# Patient Record
Sex: MALE | Race: WHITE | NOT HISPANIC OR LATINO | Employment: PART TIME | URBAN - METROPOLITAN AREA
[De-identification: names, ages, dates, MRNs, and addresses within clinical notes are randomized per-mention and may not be internally consistent; named-entity substitution may affect disease eponyms.]

---

## 2017-03-03 RX ORDER — AMOXICILLIN 500 MG/1
CAPSULE ORAL
Qty: 8 CAPSULE | Refills: 0 | Status: ON HOLD | OUTPATIENT
Start: 2017-03-03 | End: 2022-03-22 | Stop reason: HOSPADM

## 2022-03-17 ENCOUNTER — HOSPITAL ENCOUNTER (OUTPATIENT)
Dept: PREADMISSION TESTING | Facility: OTHER | Age: 72
Discharge: HOME | End: 2022-03-17
Attending: ORTHOPAEDIC SURGERY
Payer: MEDICARE

## 2022-03-17 ENCOUNTER — ANESTHESIA EVENT (OUTPATIENT)
Dept: SURGERY | Facility: OTHER | Age: 72
End: 2022-03-17
Payer: MEDICARE

## 2022-03-17 VITALS
HEART RATE: 68 BPM | WEIGHT: 230 LBS | SYSTOLIC BLOOD PRESSURE: 156 MMHG | OXYGEN SATURATION: 98 % | DIASTOLIC BLOOD PRESSURE: 84 MMHG | HEIGHT: 71 IN | BODY MASS INDEX: 32.2 KG/M2

## 2022-03-17 DIAGNOSIS — Z01.818 PREOP TESTING: Primary | ICD-10-CM

## 2022-03-17 LAB
ANION GAP SERPL CALC-SCNC: 9 MMOL/L (ref 8–16)
BASOPHILS # BLD AUTO: 0.04 K/UL (ref 0–0.2)
BASOPHILS NFR BLD: 0.7 % (ref 0–1.9)
BUN SERPL-MCNC: 21 MG/DL (ref 8–23)
CALCIUM SERPL-MCNC: 9.5 MG/DL (ref 8.7–10.5)
CHLORIDE SERPL-SCNC: 110 MMOL/L (ref 95–110)
CO2 SERPL-SCNC: 24 MMOL/L (ref 23–29)
CREAT SERPL-MCNC: 0.9 MG/DL (ref 0.5–1.4)
DIFFERENTIAL METHOD: ABNORMAL
EOSINOPHIL # BLD AUTO: 0.1 K/UL (ref 0–0.5)
EOSINOPHIL NFR BLD: 1.2 % (ref 0–8)
ERYTHROCYTE [DISTWIDTH] IN BLOOD BY AUTOMATED COUNT: 13.3 % (ref 11.5–14.5)
EST. GFR  (AFRICAN AMERICAN): >60 ML/MIN/1.73 M^2
EST. GFR  (NON AFRICAN AMERICAN): >60 ML/MIN/1.73 M^2
GLUCOSE SERPL-MCNC: 168 MG/DL (ref 70–110)
HCT VFR BLD AUTO: 47.3 % (ref 40–54)
HGB BLD-MCNC: 16 G/DL (ref 14–18)
IMM GRANULOCYTES # BLD AUTO: 0.04 K/UL (ref 0–0.04)
IMM GRANULOCYTES NFR BLD AUTO: 0.7 % (ref 0–0.5)
LYMPHOCYTES # BLD AUTO: 1.8 K/UL (ref 1–4.8)
LYMPHOCYTES NFR BLD: 31.2 % (ref 18–48)
MCH RBC QN AUTO: 34.3 PG (ref 27–31)
MCHC RBC AUTO-ENTMCNC: 33.8 G/DL (ref 32–36)
MCV RBC AUTO: 101 FL (ref 82–98)
MONOCYTES # BLD AUTO: 0.7 K/UL (ref 0.3–1)
MONOCYTES NFR BLD: 12.1 % (ref 4–15)
NEUTROPHILS # BLD AUTO: 3.2 K/UL (ref 1.8–7.7)
NEUTROPHILS NFR BLD: 54.1 % (ref 38–73)
NRBC BLD-RTO: 0 /100 WBC
PLATELET # BLD AUTO: 153 K/UL (ref 150–450)
PLATELET BLD QL SMEAR: ABNORMAL
PMV BLD AUTO: 10.4 FL (ref 9.2–12.9)
POLYCHROMASIA BLD QL SMEAR: ABNORMAL
POTASSIUM SERPL-SCNC: 4.8 MMOL/L (ref 3.5–5.1)
RBC # BLD AUTO: 4.67 M/UL (ref 4.6–6.2)
SODIUM SERPL-SCNC: 143 MMOL/L (ref 136–145)
WBC # BLD AUTO: 5.86 K/UL (ref 3.9–12.7)

## 2022-03-17 PROCEDURE — 85025 COMPLETE CBC W/AUTO DIFF WBC: CPT | Performed by: ANESTHESIOLOGY

## 2022-03-17 PROCEDURE — 80048 BASIC METABOLIC PNL TOTAL CA: CPT | Performed by: ANESTHESIOLOGY

## 2022-03-17 PROCEDURE — 36415 COLL VENOUS BLD VENIPUNCTURE: CPT | Performed by: ANESTHESIOLOGY

## 2022-03-17 RX ORDER — SOTALOL HYDROCHLORIDE 120 MG/1
TABLET ORAL 2 TIMES DAILY
COMMUNITY
Start: 2022-01-13

## 2022-03-17 RX ORDER — GLUCOSAMINE/CHONDRO SU A 500-400 MG
1 TABLET ORAL 3 TIMES DAILY
COMMUNITY
End: 2023-02-14 | Stop reason: CLARIF

## 2022-03-17 RX ORDER — ACETAMINOPHEN 500 MG
1000 TABLET ORAL
Status: CANCELLED | OUTPATIENT
Start: 2022-03-17 | End: 2022-03-17

## 2022-03-17 RX ORDER — MULTIVITAMIN
1 TABLET ORAL DAILY
COMMUNITY
End: 2023-02-14 | Stop reason: CLARIF

## 2022-03-17 RX ORDER — SODIUM CHLORIDE, SODIUM LACTATE, POTASSIUM CHLORIDE, CALCIUM CHLORIDE 600; 310; 30; 20 MG/100ML; MG/100ML; MG/100ML; MG/100ML
INJECTION, SOLUTION INTRAVENOUS CONTINUOUS
Status: CANCELLED | OUTPATIENT
Start: 2022-03-17

## 2022-03-17 RX ORDER — NAPROXEN SODIUM 220 MG
220 TABLET ORAL 2 TIMES DAILY WITH MEALS
COMMUNITY
End: 2023-02-14 | Stop reason: CLARIF

## 2022-03-17 RX ORDER — METFORMIN HYDROCHLORIDE 750 MG/1
TABLET, EXTENDED RELEASE ORAL
COMMUNITY
Start: 2022-02-04

## 2022-03-17 RX ORDER — LIDOCAINE HYDROCHLORIDE 10 MG/ML
0.5 INJECTION, SOLUTION EPIDURAL; INFILTRATION; INTRACAUDAL; PERINEURAL ONCE
Status: CANCELLED | OUTPATIENT
Start: 2022-03-17 | End: 2022-03-17

## 2022-03-17 RX ORDER — APIXABAN 5 MG/1
TABLET, FILM COATED ORAL
Status: ON HOLD | COMMUNITY
Start: 2022-01-19 | End: 2022-03-22 | Stop reason: HOSPADM

## 2022-03-17 RX ORDER — PEN NEEDLE, DIABETIC 31 GX5/16"
NEEDLE, DISPOSABLE MISCELLANEOUS
COMMUNITY
Start: 2022-01-13

## 2022-03-17 RX ORDER — INSULIN GLARGINE 100 [IU]/ML
INJECTION, SOLUTION SUBCUTANEOUS 2 TIMES DAILY
COMMUNITY
Start: 2022-01-26

## 2022-03-17 RX ORDER — FAMOTIDINE 20 MG/1
20 TABLET, FILM COATED ORAL
Status: CANCELLED | OUTPATIENT
Start: 2022-03-17 | End: 2022-03-17

## 2022-03-17 RX ORDER — LISINOPRIL 20 MG/1
TABLET ORAL DAILY
COMMUNITY
Start: 2022-03-12

## 2022-03-17 RX ORDER — EMPAGLIFLOZIN 25 MG/1
TABLET, FILM COATED ORAL DAILY
COMMUNITY
Start: 2022-01-26

## 2022-03-17 NOTE — DISCHARGE INSTRUCTIONS
Information to Prepare you for your Surgery    PRE-ADMIT TESTING -  [phone]    [address] [facility]          Your surgery has been scheduled at [facility]. We are pleased to have the opportunity to serve you. For Further Information please call [phone].    On the day of surgery please report to the Information Desk on the 1st floor.    CONTACT YOUR PHYSICIAN'S OFFICE THE DAY PRIOR TO YOUR SURGERY TO OBTAIN YOUR ARRIVAL TIME.     The evening before surgery do not eat anything after 9 p.m. ( this includes hard candy, chewing gum and mints).  You may only have GATORADE, POWERADE AND WATER  from 9 p.m. until you leave your home.   DO NOT DRINK ANY LIQUIDS ON THE WAY TO THE HOSPITAL.      Why does your anesthesiologist allow you to drink Gatorade/Powerade before surgery?  Gatorade/Powerade helps to increase your comfort before surgery and to decrease your nausea after surgery. The carbohydrates in Gatorade/Powerade help reduce your body's stress response to surgery.  If you are a diabetic-drink only water prior to surgery.      Current Visitor policy(12/27/2021) - Patients may have 2 visitors pre and post procedure. Only 2 visitors will be allowed in the Surgical building with the patient.     SPECIAL MEDICATION INSTRUCTIONS: TAKE medications checked off by the Anesthesiologist on your Medication List.    Angiogram Patients: Take medications as instructed by your physician, including aspirin.     Surgery Patients:    If you take ASPIRIN - Your PHYSICIAN/SURGEON will need to inform you IF/OR when you need to stop taking aspirin prior to your surgery.     Do Not take any medications containing IBUPROFEN.    Do Not Wear any make-up (especially eye make-up) to surgery. Please remove any false eyelashes or eyelash extensions. If you arrive the day of surgery with makeup/eyelashes on you will be required to remove prior to surgery. (There is a risk of corneal  abrasions if eye makeup/eyelash extensions are not removed)      Leave all valuables at home.   Do Not wear any jewelry or watches, including any metal in body piercings. Jewelry must be removed prior to coming to the hospital.  There is a possibility that rings that are unable to be removed may be cut off if they are on the surgical extremity.    Please remove all hair extensions, wigs, clips and any other metal accessories/ ornaments from your hair.  These items may pose a flammable/fire risk in Surgery and must be removed.    Do not shave your surgical area at least 5 days prior to your surgery. The surgical prep will be performed at the hospital according to Infection Control regulations.    Contact Lens must be removed before surgery. Either do not wear the contact lens or bring a case and solution for storage.  Please bring a container for eyeglasses or dentures as required.  Bring any paperwork your physician has provided, such as consent forms,  history and physicals, doctor's orders, etc.   Bring comfortable clothes that are loose fitting to wear upon discharge. Take into consideration the type of surgery being performed.  Maintain your diet as advised per your physician the day prior to surgery.      Adequate rest the night before surgery is advised.   Park in the Parking lot behind the hospital or in the [facility] across the street from the parking lot. Parking is complimentary.  If you will be discharged the same day as your procedure, please arrange for a responsible adult to drive you home or to accompany you if traveling by taxi.   YOU WILL NOT BE PERMITTED TO DRIVE OR TO LEAVE THE HOSPITAL ALONE AFTER SURGERY.   If you are being discharged the same day, it is strongly recommended that you arrange for someone to remain with you for the first 24 hrs following your surgery.    The Surgeon will speak to your family/visitor after your surgery regarding the outcome of your surgery and post op  care.  The Surgeon may speak to you after your surgery, but there is a possibility you may not remember the details.  Please check with your family members regarding the conversation with the Surgeon.    We strongly recommend whoever is bringing you home be present for discharge instructions.  This will ensure a thorough understanding for your post op home care.    ALL CHILDREN MUST ALWAYS BE ACCOMPANIED BY AN ADULT.    Visitors-Refer to current Visitor policy handouts.    Thank you for your cooperation.  The Staff of [facility].            Bathing Instructions with Hibiclens    Shower the evening before and morning of your procedure with Hibiclens:  Wash your face with water and your regular face wash/soap  Apply Hibiclens directly on your skin or on a wet washcloth and wash gently. When showering: Move away from the shower stream when applying Hibiclens to avoid rinsing off too soon.  Rinse thoroughly with warm water  Do not dilute Hibiclens        Dry off as usual, do not use any deodorant, powder, body lotions, perfume, after shave or cologne.

## 2022-03-17 NOTE — ANESTHESIA PREPROCEDURE EVALUATION
03/17/2022  Anastacio Michaels is a 71 y.o., male.      Pre-op Assessment    I have reviewed the Patient Summary Reports.     I have reviewed the Nursing Notes. I have reviewed the NPO Status.   I have reviewed the Medications.     Review of Systems  Anesthesia Hx:  Hx of Anesthetic complications (post op nausea)    Social:  Smoker    Cardiovascular:   Hypertension, well controlled CAD  Dysrhythmias atrial fibrillation    Pulmonary:  Pulmonary Normal    Hepatic/GI:  Hepatic/GI Normal    Endocrine:   Diabetes, well controlled        Physical Exam  General: Well nourished    Airway:  Mallampati: II   Mouth Opening: Normal  TM Distance: Normal  Tongue: Normal  Neck ROM: Normal ROM    Dental:  Intact        Anesthesia Plan  Type of Anesthesia, risks & benefits discussed:    Anesthesia Type: Gen ETT  Intra-op Monitoring Plan: Standard ASA Monitors  Post Op Pain Control Plan: multimodal analgesia and peripheral nerve block  Induction:  IV  Airway Plan: Video  Informed Consent: Informed consent signed with the Patient and all parties understand the risks and agree with anesthesia plan.  All questions answered.   ASA Score: 3  Anesthesia Plan Notes: Pt has cardiac clearance in care everywhere 2/21/22      Discussed nerve block       Bmp/cbc done today    H&H/electrolytes normal    Ready For Surgery From Anesthesia Perspective.     .

## 2022-03-22 ENCOUNTER — ANESTHESIA (OUTPATIENT)
Dept: SURGERY | Facility: OTHER | Age: 72
End: 2022-03-22
Payer: MEDICARE

## 2022-03-22 ENCOUNTER — HOSPITAL ENCOUNTER (OUTPATIENT)
Facility: OTHER | Age: 72
Discharge: HOME | End: 2022-03-23
Attending: ORTHOPAEDIC SURGERY | Admitting: ORTHOPAEDIC SURGERY
Payer: MEDICARE

## 2022-03-22 DIAGNOSIS — M19.011 PRIMARY OSTEOARTHRITIS OF RIGHT SHOULDER: Primary | ICD-10-CM

## 2022-03-22 DIAGNOSIS — M19.011 OSTEOARTHRITIS OF RIGHT SHOULDER REGION: ICD-10-CM

## 2022-03-22 LAB
ESTIMATED AVG GLUCOSE: 146 MG/DL (ref 68–131)
HBA1C MFR BLD: 6.7 % (ref 4–5.6)
POCT GLUCOSE: 249 MG/DL (ref 70–110)
POCT GLUCOSE: 264 MG/DL (ref 70–110)

## 2022-03-22 PROCEDURE — P9045 ALBUMIN (HUMAN), 5%, 250 ML: HCPCS | Mod: JG | Performed by: NURSE ANESTHETIST, CERTIFIED REGISTERED

## 2022-03-22 PROCEDURE — 82962 GLUCOSE BLOOD TEST: CPT | Performed by: ORTHOPAEDIC SURGERY

## 2022-03-22 PROCEDURE — 36415 COLL VENOUS BLD VENIPUNCTURE: CPT | Performed by: INTERNAL MEDICINE

## 2022-03-22 PROCEDURE — 25000003 PHARM REV CODE 250: Performed by: ORTHOPAEDIC SURGERY

## 2022-03-22 PROCEDURE — 76942 ECHO GUIDE FOR BIOPSY: CPT | Performed by: ANESTHESIOLOGY

## 2022-03-22 PROCEDURE — 27201423 OPTIME MED/SURG SUP & DEVICES STERILE SUPPLY: Performed by: ORTHOPAEDIC SURGERY

## 2022-03-22 PROCEDURE — 25000003 PHARM REV CODE 250: Performed by: NURSE ANESTHETIST, CERTIFIED REGISTERED

## 2022-03-22 PROCEDURE — 83036 HEMOGLOBIN GLYCOSYLATED A1C: CPT | Performed by: INTERNAL MEDICINE

## 2022-03-22 PROCEDURE — 63600175 PHARM REV CODE 636 W HCPCS: Performed by: ORTHOPAEDIC SURGERY

## 2022-03-22 PROCEDURE — 63600175 PHARM REV CODE 636 W HCPCS: Performed by: NURSE ANESTHETIST, CERTIFIED REGISTERED

## 2022-03-22 PROCEDURE — 63600175 PHARM REV CODE 636 W HCPCS: Mod: JG | Performed by: NURSE ANESTHETIST, CERTIFIED REGISTERED

## 2022-03-22 PROCEDURE — 71000033 HC RECOVERY, INTIAL HOUR: Performed by: ORTHOPAEDIC SURGERY

## 2022-03-22 PROCEDURE — 94761 N-INVAS EAR/PLS OXIMETRY MLT: CPT

## 2022-03-22 PROCEDURE — 25000003 PHARM REV CODE 250: Performed by: INTERNAL MEDICINE

## 2022-03-22 PROCEDURE — 37000008 HC ANESTHESIA 1ST 15 MINUTES: Performed by: ORTHOPAEDIC SURGERY

## 2022-03-22 PROCEDURE — 37000009 HC ANESTHESIA EA ADD 15 MINS: Performed by: ORTHOPAEDIC SURGERY

## 2022-03-22 PROCEDURE — 63600175 PHARM REV CODE 636 W HCPCS: Performed by: ANESTHESIOLOGY

## 2022-03-22 PROCEDURE — 63600175 PHARM REV CODE 636 W HCPCS: Performed by: INTERNAL MEDICINE

## 2022-03-22 PROCEDURE — 25000003 PHARM REV CODE 250: Performed by: ANESTHESIOLOGY

## 2022-03-22 PROCEDURE — 97165 OT EVAL LOW COMPLEX 30 MIN: CPT

## 2022-03-22 PROCEDURE — 36000710: Performed by: ORTHOPAEDIC SURGERY

## 2022-03-22 PROCEDURE — 71000039 HC RECOVERY, EACH ADD'L HOUR: Performed by: ORTHOPAEDIC SURGERY

## 2022-03-22 PROCEDURE — 63600175 PHARM REV CODE 636 W HCPCS: Mod: JG | Performed by: ANESTHESIOLOGY

## 2022-03-22 PROCEDURE — C1713 ANCHOR/SCREW BN/BN,TIS/BN: HCPCS | Performed by: ORTHOPAEDIC SURGERY

## 2022-03-22 PROCEDURE — 27100025 HC TUBING, SET FLUID WARMER: Performed by: ANESTHESIOLOGY

## 2022-03-22 PROCEDURE — 94799 UNLISTED PULMONARY SVC/PX: CPT

## 2022-03-22 PROCEDURE — 97535 SELF CARE MNGMENT TRAINING: CPT

## 2022-03-22 PROCEDURE — 27000221 HC OXYGEN, UP TO 24 HOURS

## 2022-03-22 PROCEDURE — C1776 JOINT DEVICE (IMPLANTABLE): HCPCS | Performed by: ORTHOPAEDIC SURGERY

## 2022-03-22 PROCEDURE — P9045 ALBUMIN (HUMAN), 5%, 250 ML: HCPCS | Mod: JG | Performed by: ANESTHESIOLOGY

## 2022-03-22 PROCEDURE — 97110 THERAPEUTIC EXERCISES: CPT

## 2022-03-22 PROCEDURE — 36000711: Performed by: ORTHOPAEDIC SURGERY

## 2022-03-22 DEVICE — INSERT SOCKET E+ SM STD SZ32: Type: IMPLANTABLE DEVICE | Site: SHOULDER | Status: FUNCTIONAL

## 2022-03-22 DEVICE — HEAD GLENOID RSP 32MM REVERSE: Type: IMPLANTABLE DEVICE | Site: SHOULDER | Status: FUNCTIONAL

## 2022-03-22 DEVICE — SCREW GLENOID BASEPLT 5X34MM: Type: IMPLANTABLE DEVICE | Site: SHOULDER | Status: FUNCTIONAL

## 2022-03-22 DEVICE — BASEPLATE GLENOID REV RSP P2: Type: IMPLANTABLE DEVICE | Site: SHOULDER | Status: FUNCTIONAL

## 2022-03-22 DEVICE — SCREW BONE RSP 6.5X18 GLENOID: Type: IMPLANTABLE DEVICE | Site: SHOULDER | Status: FUNCTIONAL

## 2022-03-22 DEVICE — SCREW BONE LOCK 5X30MM TI: Type: IMPLANTABLE DEVICE | Site: SHOULDER | Status: FUNCTIONAL

## 2022-03-22 DEVICE — STEM ALTIVATE REV SZ10 108MM: Type: IMPLANTABLE DEVICE | Site: SHOULDER | Status: FUNCTIONAL

## 2022-03-22 RX ORDER — MORPHINE SULFATE 4 MG/ML
3 INJECTION, SOLUTION INTRAMUSCULAR; INTRAVENOUS
Status: DISCONTINUED | OUTPATIENT
Start: 2022-03-22 | End: 2022-03-23 | Stop reason: HOSPADM

## 2022-03-22 RX ORDER — CEFAZOLIN SODIUM 1 G/3ML
2 INJECTION, POWDER, FOR SOLUTION INTRAMUSCULAR; INTRAVENOUS
Status: DISCONTINUED | OUTPATIENT
Start: 2022-03-22 | End: 2022-03-22

## 2022-03-22 RX ORDER — FAMOTIDINE 20 MG/1
20 TABLET, FILM COATED ORAL
Status: COMPLETED | OUTPATIENT
Start: 2022-03-22 | End: 2022-03-22

## 2022-03-22 RX ORDER — DEXTROSE MONOHYDRATE AND SODIUM CHLORIDE 5; .9 G/100ML; G/100ML
INJECTION, SOLUTION INTRAVENOUS CONTINUOUS
Status: DISCONTINUED | OUTPATIENT
Start: 2022-03-22 | End: 2022-03-23 | Stop reason: HOSPADM

## 2022-03-22 RX ORDER — ONDANSETRON 2 MG/ML
INJECTION INTRAMUSCULAR; INTRAVENOUS
Status: DISCONTINUED | OUTPATIENT
Start: 2022-03-22 | End: 2022-03-22

## 2022-03-22 RX ORDER — GLUCAGON 1 MG
1 KIT INJECTION
Status: DISCONTINUED | OUTPATIENT
Start: 2022-03-22 | End: 2022-03-23 | Stop reason: HOSPADM

## 2022-03-22 RX ORDER — HYDROMORPHONE HYDROCHLORIDE 2 MG/ML
0.4 INJECTION, SOLUTION INTRAMUSCULAR; INTRAVENOUS; SUBCUTANEOUS EVERY 5 MIN PRN
Status: CANCELLED | OUTPATIENT
Start: 2022-03-22

## 2022-03-22 RX ORDER — CEFAZOLIN SODIUM 1 G/3ML
2 INJECTION, POWDER, FOR SOLUTION INTRAMUSCULAR; INTRAVENOUS
Status: COMPLETED | OUTPATIENT
Start: 2022-03-22 | End: 2022-03-22

## 2022-03-22 RX ORDER — IBUPROFEN 200 MG
16 TABLET ORAL
Status: DISCONTINUED | OUTPATIENT
Start: 2022-03-22 | End: 2022-03-23 | Stop reason: HOSPADM

## 2022-03-22 RX ORDER — EPHEDRINE SULFATE 50 MG/ML
INJECTION, SOLUTION INTRAVENOUS
Status: DISCONTINUED | OUTPATIENT
Start: 2022-03-22 | End: 2022-03-22

## 2022-03-22 RX ORDER — FAMOTIDINE 20 MG/1
20 TABLET, FILM COATED ORAL 2 TIMES DAILY
Status: DISCONTINUED | OUTPATIENT
Start: 2022-03-22 | End: 2022-03-23 | Stop reason: HOSPADM

## 2022-03-22 RX ORDER — ROPIVACAINE HYDROCHLORIDE 5 MG/ML
INJECTION, SOLUTION EPIDURAL; INFILTRATION; PERINEURAL
Status: COMPLETED | OUTPATIENT
Start: 2022-03-22 | End: 2022-03-22

## 2022-03-22 RX ORDER — IBUPROFEN 200 MG
24 TABLET ORAL
Status: DISCONTINUED | OUTPATIENT
Start: 2022-03-22 | End: 2022-03-23 | Stop reason: HOSPADM

## 2022-03-22 RX ORDER — SOTALOL HYDROCHLORIDE 80 MG/1
120 TABLET ORAL 2 TIMES DAILY
Status: DISCONTINUED | OUTPATIENT
Start: 2022-03-22 | End: 2022-03-23 | Stop reason: HOSPADM

## 2022-03-22 RX ORDER — ATORVASTATIN CALCIUM 20 MG/1
80 TABLET, FILM COATED ORAL NIGHTLY
Status: DISCONTINUED | OUTPATIENT
Start: 2022-03-22 | End: 2022-03-23 | Stop reason: HOSPADM

## 2022-03-22 RX ORDER — MUPIROCIN 20 MG/G
1 OINTMENT TOPICAL 2 TIMES DAILY
Status: DISCONTINUED | OUTPATIENT
Start: 2022-03-22 | End: 2022-03-23 | Stop reason: HOSPADM

## 2022-03-22 RX ORDER — PREGABALIN 75 MG/1
75 CAPSULE ORAL NIGHTLY
Status: DISCONTINUED | OUTPATIENT
Start: 2022-03-22 | End: 2022-03-23 | Stop reason: HOSPADM

## 2022-03-22 RX ORDER — ACETAMINOPHEN 500 MG
1000 TABLET ORAL EVERY 6 HOURS
Status: DISCONTINUED | OUTPATIENT
Start: 2022-03-22 | End: 2022-03-23 | Stop reason: HOSPADM

## 2022-03-22 RX ORDER — ONDANSETRON 8 MG/1
8 TABLET, ORALLY DISINTEGRATING ORAL EVERY 8 HOURS PRN
Status: DISCONTINUED | OUTPATIENT
Start: 2022-03-22 | End: 2022-03-23 | Stop reason: HOSPADM

## 2022-03-22 RX ORDER — LIDOCAINE HYDROCHLORIDE 10 MG/ML
0.5 INJECTION, SOLUTION EPIDURAL; INFILTRATION; INTRACAUDAL; PERINEURAL ONCE
Status: DISCONTINUED | OUTPATIENT
Start: 2022-03-22 | End: 2022-03-22 | Stop reason: HOSPADM

## 2022-03-22 RX ORDER — ACETAMINOPHEN 500 MG
1000 TABLET ORAL
Status: DISCONTINUED | OUTPATIENT
Start: 2022-03-22 | End: 2022-03-22

## 2022-03-22 RX ORDER — INSULIN ASPART 100 [IU]/ML
0-5 INJECTION, SOLUTION INTRAVENOUS; SUBCUTANEOUS
Status: DISCONTINUED | OUTPATIENT
Start: 2022-03-22 | End: 2022-03-23 | Stop reason: HOSPADM

## 2022-03-22 RX ORDER — METFORMIN HYDROCHLORIDE 750 MG/1
750 TABLET, EXTENDED RELEASE ORAL DAILY
Status: DISCONTINUED | OUTPATIENT
Start: 2022-03-23 | End: 2022-03-23 | Stop reason: HOSPADM

## 2022-03-22 RX ORDER — PROCHLORPERAZINE EDISYLATE 5 MG/ML
5 INJECTION INTRAMUSCULAR; INTRAVENOUS EVERY 6 HOURS PRN
Status: DISCONTINUED | OUTPATIENT
Start: 2022-03-22 | End: 2022-03-23 | Stop reason: HOSPADM

## 2022-03-22 RX ORDER — PHENYLEPHRINE HYDROCHLORIDE 10 MG/ML
INJECTION INTRAVENOUS
Status: DISCONTINUED | OUTPATIENT
Start: 2022-03-22 | End: 2022-03-22

## 2022-03-22 RX ORDER — CELECOXIB 200 MG/1
200 CAPSULE ORAL DAILY
Status: DISCONTINUED | OUTPATIENT
Start: 2022-03-22 | End: 2022-03-23 | Stop reason: HOSPADM

## 2022-03-22 RX ORDER — SODIUM CHLORIDE, SODIUM LACTATE, POTASSIUM CHLORIDE, CALCIUM CHLORIDE 600; 310; 30; 20 MG/100ML; MG/100ML; MG/100ML; MG/100ML
INJECTION, SOLUTION INTRAVENOUS CONTINUOUS
Status: DISCONTINUED | OUTPATIENT
Start: 2022-03-22 | End: 2022-03-23 | Stop reason: HOSPADM

## 2022-03-22 RX ORDER — ROCURONIUM BROMIDE 10 MG/ML
INJECTION, SOLUTION INTRAVENOUS
Status: DISCONTINUED | OUTPATIENT
Start: 2022-03-22 | End: 2022-03-22

## 2022-03-22 RX ORDER — CELECOXIB 200 MG/1
400 CAPSULE ORAL
Status: COMPLETED | OUTPATIENT
Start: 2022-03-22 | End: 2022-03-22

## 2022-03-22 RX ORDER — SODIUM CHLORIDE 0.9 % (FLUSH) 0.9 %
10 SYRINGE (ML) INJECTION
Status: DISCONTINUED | OUTPATIENT
Start: 2022-03-22 | End: 2022-03-23 | Stop reason: HOSPADM

## 2022-03-22 RX ORDER — MEPERIDINE HYDROCHLORIDE 25 MG/ML
12.5 INJECTION INTRAMUSCULAR; INTRAVENOUS; SUBCUTANEOUS ONCE AS NEEDED
Status: CANCELLED | OUTPATIENT
Start: 2022-03-22 | End: 2022-03-23

## 2022-03-22 RX ORDER — HYDROCODONE BITARTRATE AND ACETAMINOPHEN 10; 325 MG/1; MG/1
1 TABLET ORAL EVERY 4 HOURS PRN
Status: DISCONTINUED | OUTPATIENT
Start: 2022-03-22 | End: 2022-03-23 | Stop reason: HOSPADM

## 2022-03-22 RX ORDER — MUPIROCIN 20 MG/G
OINTMENT TOPICAL
Status: DISCONTINUED | OUTPATIENT
Start: 2022-03-22 | End: 2022-03-22 | Stop reason: HOSPADM

## 2022-03-22 RX ORDER — LIDOCAINE HCL/PF 100 MG/5ML
SYRINGE (ML) INTRAVENOUS
Status: DISCONTINUED | OUTPATIENT
Start: 2022-03-22 | End: 2022-03-22

## 2022-03-22 RX ORDER — OXYCODONE HYDROCHLORIDE 5 MG/1
5 TABLET ORAL
Status: CANCELLED | OUTPATIENT
Start: 2022-03-22

## 2022-03-22 RX ORDER — ONDANSETRON 2 MG/ML
4 INJECTION INTRAMUSCULAR; INTRAVENOUS DAILY PRN
Status: CANCELLED | OUTPATIENT
Start: 2022-03-22

## 2022-03-22 RX ORDER — POLYETHYLENE GLYCOL 3350 17 G/17G
17 POWDER, FOR SOLUTION ORAL DAILY
Status: DISCONTINUED | OUTPATIENT
Start: 2022-03-22 | End: 2022-03-23 | Stop reason: HOSPADM

## 2022-03-22 RX ORDER — FENTANYL CITRATE 50 UG/ML
INJECTION, SOLUTION INTRAMUSCULAR; INTRAVENOUS
Status: DISCONTINUED | OUTPATIENT
Start: 2022-03-22 | End: 2022-03-22

## 2022-03-22 RX ORDER — HYDROCODONE BITARTRATE AND ACETAMINOPHEN 5; 325 MG/1; MG/1
1 TABLET ORAL EVERY 4 HOURS PRN
Status: DISCONTINUED | OUTPATIENT
Start: 2022-03-22 | End: 2022-03-23 | Stop reason: HOSPADM

## 2022-03-22 RX ORDER — PROPOFOL 10 MG/ML
VIAL (ML) INTRAVENOUS
Status: DISCONTINUED | OUTPATIENT
Start: 2022-03-22 | End: 2022-03-22

## 2022-03-22 RX ORDER — LISINOPRIL 20 MG/1
20 TABLET ORAL DAILY
Status: DISCONTINUED | OUTPATIENT
Start: 2022-03-23 | End: 2022-03-23 | Stop reason: HOSPADM

## 2022-03-22 RX ORDER — ACETAMINOPHEN 500 MG
1000 TABLET ORAL
Status: COMPLETED | OUTPATIENT
Start: 2022-03-22 | End: 2022-03-22

## 2022-03-22 RX ORDER — CEFAZOLIN SODIUM 2 G/50ML
2 SOLUTION INTRAVENOUS
Status: COMPLETED | OUTPATIENT
Start: 2022-03-22 | End: 2022-03-23

## 2022-03-22 RX ORDER — AMOXICILLIN 250 MG
1 CAPSULE ORAL 2 TIMES DAILY
Status: DISCONTINUED | OUTPATIENT
Start: 2022-03-22 | End: 2022-03-23 | Stop reason: HOSPADM

## 2022-03-22 RX ORDER — ALBUMIN HUMAN 50 G/1000ML
25 SOLUTION INTRAVENOUS ONCE
Status: COMPLETED | OUTPATIENT
Start: 2022-03-22 | End: 2022-03-22

## 2022-03-22 RX ORDER — TRANEXAMIC ACID 100 MG/ML
1000 INJECTION, SOLUTION INTRAVENOUS
Status: DISCONTINUED | OUTPATIENT
Start: 2022-03-22 | End: 2022-03-22 | Stop reason: HOSPADM

## 2022-03-22 RX ORDER — ALBUMIN HUMAN 50 G/1000ML
SOLUTION INTRAVENOUS CONTINUOUS PRN
Status: DISCONTINUED | OUTPATIENT
Start: 2022-03-22 | End: 2022-03-22

## 2022-03-22 RX ORDER — MIDAZOLAM HYDROCHLORIDE 1 MG/ML
INJECTION INTRAMUSCULAR; INTRAVENOUS
Status: DISCONTINUED | OUTPATIENT
Start: 2022-03-22 | End: 2022-03-22

## 2022-03-22 RX ORDER — SODIUM CHLORIDE 0.9 % (FLUSH) 0.9 %
3 SYRINGE (ML) INJECTION
Status: CANCELLED | OUTPATIENT
Start: 2022-03-22

## 2022-03-22 RX ADMIN — ACETAMINOPHEN 1000 MG: 500 TABLET, FILM COATED ORAL at 11:03

## 2022-03-22 RX ADMIN — MUPIROCIN: 20 OINTMENT TOPICAL at 09:03

## 2022-03-22 RX ADMIN — CELECOXIB 400 MG: 200 CAPSULE ORAL at 09:03

## 2022-03-22 RX ADMIN — ALBUMIN (HUMAN) 25 G: 12.5 SOLUTION INTRAVENOUS at 02:03

## 2022-03-22 RX ADMIN — ACETAMINOPHEN 1000 MG: 500 TABLET, FILM COATED ORAL at 09:03

## 2022-03-22 RX ADMIN — ONDANSETRON HYDROCHLORIDE 4 MG: 2 INJECTION INTRAMUSCULAR; INTRAVENOUS at 01:03

## 2022-03-22 RX ADMIN — CEFAZOLIN SODIUM 2 G: 2 SOLUTION INTRAVENOUS at 06:03

## 2022-03-22 RX ADMIN — SOTALOL HYDROCHLORIDE 120 MG: 80 TABLET ORAL at 08:03

## 2022-03-22 RX ADMIN — ALBUMIN (HUMAN): 12.5 SOLUTION INTRAVENOUS at 11:03

## 2022-03-22 RX ADMIN — DOCUSATE SODIUM - SENNOSIDES 1 TABLET: 50; 8.6 TABLET, FILM COATED ORAL at 08:03

## 2022-03-22 RX ADMIN — FAMOTIDINE 20 MG: 20 TABLET, FILM COATED ORAL at 09:03

## 2022-03-22 RX ADMIN — ROPIVACAINE HYDROCHLORIDE 20 ML: 5 INJECTION, SOLUTION EPIDURAL; INFILTRATION; PERINEURAL at 10:03

## 2022-03-22 RX ADMIN — ATORVASTATIN CALCIUM 80 MG: 20 TABLET, FILM COATED ORAL at 08:03

## 2022-03-22 RX ADMIN — ROCURONIUM BROMIDE 50 MG: 10 SOLUTION INTRAVENOUS at 11:03

## 2022-03-22 RX ADMIN — LIDOCAINE HYDROCHLORIDE 100 MG: 20 INJECTION, SOLUTION INTRAVENOUS at 11:03

## 2022-03-22 RX ADMIN — PHENYLEPHRINE HYDROCHLORIDE 0.5 MCG/KG/MIN: 10 INJECTION INTRAVENOUS at 11:03

## 2022-03-22 RX ADMIN — CEFAZOLIN 2 G: 330 INJECTION, POWDER, FOR SOLUTION INTRAMUSCULAR; INTRAVENOUS at 11:03

## 2022-03-22 RX ADMIN — EPHEDRINE SULFATE 10 MG: 50 INJECTION INTRAVENOUS at 12:03

## 2022-03-22 RX ADMIN — ACETAMINOPHEN 1000 MG: 500 TABLET, FILM COATED ORAL at 06:03

## 2022-03-22 RX ADMIN — CARBOXYMETHYLCELLULOSE SODIUM 2 DROP: 2.5 SOLUTION/ DROPS OPHTHALMIC at 11:03

## 2022-03-22 RX ADMIN — PROPOFOL 50 MG: 10 INJECTION, EMULSION INTRAVENOUS at 01:03

## 2022-03-22 RX ADMIN — ROCURONIUM BROMIDE 20 MG: 10 SOLUTION INTRAVENOUS at 12:03

## 2022-03-22 RX ADMIN — SODIUM CHLORIDE, SODIUM LACTATE, POTASSIUM CHLORIDE, AND CALCIUM CHLORIDE: .6; .31; .03; .02 INJECTION, SOLUTION INTRAVENOUS at 01:03

## 2022-03-22 RX ADMIN — FENTANYL CITRATE 100 MCG: 50 INJECTION, SOLUTION INTRAMUSCULAR; INTRAVENOUS at 10:03

## 2022-03-22 RX ADMIN — SODIUM CHLORIDE, SODIUM LACTATE, POTASSIUM CHLORIDE, AND CALCIUM CHLORIDE: .6; .31; .03; .02 INJECTION, SOLUTION INTRAVENOUS at 10:03

## 2022-03-22 RX ADMIN — MIDAZOLAM HYDROCHLORIDE 2 MG: 1 INJECTION, SOLUTION INTRAMUSCULAR; INTRAVENOUS at 10:03

## 2022-03-22 RX ADMIN — PHENYLEPHRINE HYDROCHLORIDE 100 MCG: 10 INJECTION INTRAVENOUS at 11:03

## 2022-03-22 RX ADMIN — GLYCOPYRROLATE 0.2 MG: 0.2 INJECTION, SOLUTION INTRAMUSCULAR; INTRAVITREAL at 11:03

## 2022-03-22 RX ADMIN — CELECOXIB 200 MG: 200 CAPSULE ORAL at 03:03

## 2022-03-22 RX ADMIN — MUPIROCIN 1 G: 20 OINTMENT TOPICAL at 08:03

## 2022-03-22 RX ADMIN — PROPOFOL 180 MG: 10 INJECTION, EMULSION INTRAVENOUS at 11:03

## 2022-03-22 RX ADMIN — FAMOTIDINE 20 MG: 20 TABLET ORAL at 08:03

## 2022-03-22 RX ADMIN — ROCURONIUM BROMIDE 20 MG: 10 SOLUTION INTRAVENOUS at 11:03

## 2022-03-22 RX ADMIN — DEXTROSE AND SODIUM CHLORIDE: 5; .9 INJECTION, SOLUTION INTRAVENOUS at 03:03

## 2022-03-22 RX ADMIN — INSULIN ASPART 1 UNITS: 100 INJECTION, SOLUTION INTRAVENOUS; SUBCUTANEOUS at 11:03

## 2022-03-22 RX ADMIN — SUGAMMADEX 200 MG: 100 INJECTION, SOLUTION INTRAVENOUS at 01:03

## 2022-03-22 RX ADMIN — HYDROCODONE BITARTRATE AND ACETAMINOPHEN 1 TABLET: 10; 325 TABLET ORAL at 02:03

## 2022-03-22 RX ADMIN — PREGABALIN 75 MG: 75 CAPSULE ORAL at 08:03

## 2022-03-22 NOTE — BRIEF OP NOTE
[facility] ([facility]   Operative Note     SUMMARY     Surgery Date: 3/22/2022     Surgeon(s) and Role:     * Claude S. Williams IV, MD - Primary    Assisting Surgeon: None    Pre-op Diagnosis:  Primary osteoarthritis of right shoulder [M19.011]  Rotator cuff tear arthropathy    Post-op Diagnosis:  Post-Op Diagnosis Codes:     * Primary osteoarthritis of right shoulder [M19.011]  Rotator cuff tear arthropathy    Procedure(s) (LRB):  ARTHROPLASTY, SHOULDER, TOTAL, REVERSE - RIGHT (Right)   DJO  surgical Press-Fit humeral stem small shell 10 x 108 mm  30 mm RSP glenoid base plate, 32 neutral RSP glenoid head with retaining screw, small socket insert 32 mm neutral    Anesthesia: General    Description of the findings of the procedure:  Left shoulder end-stage degenerative osteoarthritis, significant synovitis, chronic appearing partial biceps tendon tear, partial rotator cuff tear.     Findings/Key Components:  As above      Procedure in detail:  After proper informed consent was obtained the patient was transported the operative suite placed supine on operative table.  Preoperative interscalene block   Was administered per the anesthesiologist without difficulty.  General endotracheal anesthesia. The patient was placed into a modified beach chair position and all bony prominences were well padded.  The right shoulder was sterilely prepped with alcohol ChloraPrep and draped in usual sterile fashion.  Preoperative antibiotics were administered.  Routine preoperative time-out was taken and the operative site was positively identified by the operative team.  A standard anterior incision was made and careful dissection was carried down through the subdermal tissue using Bovie cautery for hemostasis.  The deltopectoral interval was retracted  And the conjoined tendon was retracted medially.  There was a patulous capsule with significant synovial fluid and degenerative tearing of the rotator cuff supraspinatus and  subscapularis as well as biceps tendon which was excised.  The shoulder was dislocated.  The humeral head cut was made according to the preoperative surgical plan.  Attention was turned to the glenoid where releases were performed and a guide pin was placed using the guide and this was measured 30 mm.  The tap was placed and reaming was carried out..  There was significant glenoid retroversion noted.  This was corrected with reaming slightly offset reaming more of the inferior and anterior glenoid as according to the preoperative plan.  The base plate was then placed and had excellent purchase then 4 peripheral locking screws were each drilled measured and appropriate length locking screws were placed and an excellent secure fixation in the base plate.  The RSP glenoid head was then impacted on the Wolfe taper and the center screw was secured.  Attention was then turned to the humerus where reaming of the cup was carried out followed by hand reaming and broaching to size 10 humeral stem which was impacted in place.  The shoulder was then taken through trials and after trialing various insert for stability.   final implants were inserted.  The shoulder was stable through range of motion with full elevation, 50° external rotation and internal rotation without impingement.  The wound was irrigated and hemostasis was again confirmed.  The wound was then closed over a medium Hemovac drain using  2. Ethibond to close the subscapularis and rotator cuff interval remaining. .  The skin was approximated with 2-0 Vicryl and sterile skin staples.  Sterile soft dressing was then applied.  An abduction pillow was placed.  The patient was transported to the operative suite and taken to recovery in stable condition he tolerated the procedure very well.  Lap instrument and needle counts were correct.  Blood loss minimal.  Complications none    Findings/Key Components:  End-stage degenerative cartilage wear with eburnated humeral  "head and glenoid and tear of the supraspinatus and partial infraspinatus.    Estimated Blood Loss: 250 mL         Specimens:   Specimen (24h ago, onward)            None          Discharge Note    SUMMARY     Admit Date: 3/22/2022    Discharge Date and Time:  03/23/2022 7:57 AM    Hospital Course (synopsis of major diagnoses, care, treatment, and services provided during the course of the hospital stay): Tolerated the procedure well. POD #1 comfortable, tolerating po, voiding. Neuro, vasc intact     Final Diagnosis: Post-Op Diagnosis Codes:     * Primary osteoarthritis of right shoulder [M19.011]    Disposition: Home or Self Care    Follow Up/Patient Instructions:     Medications:  Reconciled Home Medications:      Medication List      CONTINUE taking these medications    atorvastatin 80 MG tablet  Commonly known as: LIPITOR     BD ULTRA-FINE SHORT PEN NEEDLE 31 gauge x 5/16" Ndle  Generic drug: pen needle, diabetic     FLUVIRIN [id number] 45 mcg (15 mcg x 3)/0.5 mL Susp  Generic drug: flu vaccine ts 2015-16(4yr,up)     glucosamine-chondroitin 500-400 mg tablet  Take 1 tablet by mouth 3 (three) times daily.     HEALTHY EYES ORAL  Take by mouth.     JARDIANCE 25 mg tablet  Generic drug: empagliflozin     LANTUS SOLOSTAR U-100 INSULIN glargine 100 units/mL (3mL) SubQ pen  Generic drug: insulin     lisinopriL 20 MG tablet  Commonly known as: PRINIVIL,ZESTRIL     metFORMIN 750 MG ER 24hr tablet  Commonly known as: GLUCOPHAGE-XR     multivitamin per tablet  Commonly known as: THERAGRAN  Take 1 tablet by mouth once daily.     naproxen sodium 220 MG tablet  Commonly known as: ANAPROX  Take 220 mg by mouth 2 (two) times daily with meals.     sotaloL 120 MG Tab  Commonly known as: BETAPACE        STOP taking these medications    amoxicillin 500 MG capsule  Commonly known as: AMOXIL     aspirin 81 MG EC tablet  Commonly known as: ECOTRIN     ELIQUIS 5 mg Tab  Generic drug: apixaban          Discharge Procedure Orders "   Discharge diet   Order Comments: Eat a bland diet for the first day after surgery. Progress your diet as tolerated. Constipation may occur with Narcotic usage, contact our office if you are experiencing constipation.     Notify physician   Order Comments: Call the doctor's office immediately if you experience any of the following:  - Excessive bleeding or pus like drainage at the incision site  - Uncontrollable pain not relieved by pain medication  - Excessive swelling or redness at the incision site  - Fever above 101.5 degrees not controlled with Tylenol or Motrin  - Shortness of Breath  - Any foul odor or blistering from the surgery site  - Persistent nausea and vomiting or diarrhea  - Difficulty breathing or increased cough  - Severe persistent headache  - Persistent dizziness, light-headedness, or visual disturbances  - Increased confusion or weakness     Change dressing - Aquacel   Order Comments: Home health RN will change the dressing in 5 days     Discharge instructions - Pain Management Information   Order Comments: Pain Management: A cold therapy cuff, pain medications, local injections, and in some cases, regional anesthesia injections are used to manage your post-operative pain. The decision to use each of these options is based on their risks and benefits.  Medications: You were given one or more of the following medication prescriptions before leaving the hospital. Have the prescriptions filled at a pharmacy on your way home and follow the instructions on the bottles. If you need a refill, please call your pharmacy.   Narcotic Medication (usually Vicodin ES, Lortab, Percocet or Nucynta): Begin taking the medication before your shoulder starts to hurt. Some patients do not like to take any medication, but if you wait until your pain is severe before taking, you will be very uncomfortable for several hours waiting for the narcotic to work. Always take with food.  Nausea / Vomiting: For this issue, we  prescribe Phenergan, use this medication as directed.  Cold Therapy: You may have been sent home with a Brightergy® cold therapy unit and wrap for your shoulder. Fill with ice and water to the indicated fill line and use throughout the day for the first two days and then as needed to help relieve pain and control swelling.   Regional Anesthesia Injections (Blocks): You may have been given a regional nerve block either before or after surgery. This may make your entire shoulder numb for 24-36 hours.     Showering - Aquacel   Order Comments: You may shower after 24 hours     1:  Activity   Order Comments: Exercises to be performed 5 times per day for 5 minutes each time; You may have been sent home with a sling / pillow attachment holding your arm away from your body. You may remove the sling when changing clothes or bathing. Make sure to wear the sling while sleeping unless instructed otherwise. You may remove at rest or for exercises; no abduction, no external rotation, non weight bearing, and no active ROM on shoulder.   Exercises:   Pendulums: Bend forward allowing your arm to hang down in front of you. Gently swing your arm side-to-side and front to back  Elbow Motion: Straighten and bend your elbow.  Ball Squeezes: Use ball attached to sling/pillow or soft (nerf) ball for  strengthening  Shoulder Shrugs: Shrug your shoulders up and down.  Shoulder Retractions: (Squeeze shoulder blades together): Squeeze shoulder blades together while slightly pulling them down (do not shrug your shoulders upward); You can perform 10-15 reps, several times throughout the day, when seated at your desk, driving in the car, etc.

## 2022-03-22 NOTE — PLAN OF CARE
Problem: Occupational Therapy  Goal: Occupational Therapy Goal  Description: Goals to be met by: 3/23/22    Patient will increase functional independence with ADLs by performing:    Pt and wife will verbalize understanding of pt's right UE precautions and how to adhere to pt's precautions during ADL and ADL mobility.    Pt and wife will verbalize and demonstrate understanding of how cryotherapy fits on pt's right UE acknowledging precautions and purpose of cryotherapy.     Pt and wife will verbalize and demonstrate understanding of correct positioning of pt's right UE in immobilizer and how to position right UE when lying in bed/sitting in chair with immobilizer donned.    Pt and wife will verbalize and demonstrate understanding of pt's right UE HEP adhering to precautions.  Pt and wife will verbalize and demonstrate understanding of safety strategies to reduce pt fall risk during ADLs and ADL mobility.            Outcome: Ongoing, Progressing

## 2022-03-22 NOTE — CONSULTS
Consult Note  Internal Medicine    Consult Requested By: Claude S. Williams IV, MD  Reason for Consult: post op management of HTN, DM    SUBJECTIVE:     History of Present Illness:  Patient is a 71 y.o. male presents s/p right total reverse shoulder arthroplasty. Pt seen on floor and doing well currently eating a large family size bag of Cheetos.  Wife at bedside.  Patient has some pain in the operative shoulder but otherwise denies any symptoms upon review including chest pain shortness of breath nausea vomiting diaphoresis or palpitations.  He does tell me that he took all of his medications this morning except for his Eliquis and aspirin.    Past Medical History:   Diagnosis Date    Arthritis     Atrial fibrillation     Coronary artery disease     Diabetes mellitus     Hypertension     PONV (postoperative nausea and vomiting)      Past Surgical History:   Procedure Laterality Date    ANKLE SURGERY Left 1986    ARTHROPLASTY OF SHOULDER Left     COLONOSCOPY      several    CORONARY ANGIOPLASTY WITH STENT PLACEMENT      x2    HERNIA REPAIR      KNEE ARTHROSCOPY Bilateral     ROTATOR CUFF REPAIR Right      History reviewed. No pertinent family history.  Social History     Tobacco Use    Smoking status: Current Every Day Smoker     Packs/day: 1.00     Types: Cigarettes    Smokeless tobacco: Never Used   Substance Use Topics    Alcohol use: Yes    Drug use: Never       Medications Prior to Admission   Medication Sig Dispense Refill Last Dose    glucosamine-chondroitin 500-400 mg tablet Take 1 tablet by mouth 3 (three) times daily.   Past Week at Unknown time    JARDIANCE 25 mg tablet    3/22/2022 at 0500    LANTUS SOLOSTAR U-100 INSULIN glargine 100 units/mL (3mL) SubQ pen    3/22/2022 at 0500    lisinopriL (PRINIVIL,ZESTRIL) 20 MG tablet    3/22/2022 at 0500    metFORMIN (GLUCOPHAGE-XR) 750 MG ER 24hr tablet    3/22/2022 at 0500    multivitamin (THERAGRAN) per tablet Take 1 tablet by mouth once  "daily.   3/22/2022 at 0500    naproxen sodium (ANAPROX) 220 MG tablet Take 220 mg by mouth 2 (two) times daily with meals.   Past Week at Unknown time    sotaloL (BETAPACE) 120 MG Tab    3/22/2022 at 0500    vits A,C,E/lutein/minerals (HEALTHY EYES ORAL) Take by mouth.   3/22/2022 at 0500    [DISCONTINUED] aspirin (ECOTRIN) 81 MG EC tablet Take 81 mg by mouth once daily.   Past Week at Unknown time    [DISCONTINUED] ELIQUIS 5 mg Tab    Past Week at Unknown time    atorvastatin (LIPITOR) 80 MG tablet    More than a month at Unknown time    BD ULTRA-FINE SHORT PEN NEEDLE 31 gauge x 5/16" Ndle        FLUVIRIN [id number] 45 mcg (15 mcg x 3)/0.5 mL Susp        [DISCONTINUED] amoxicillin (AMOXIL) 500 MG capsule TAKE 4 CAPSULES BY MOUTH 1 HOUR BEFORE DENTAL APPOINTMENT 8 capsule 0 More than a month at Unknown time       Review of patient's allergies indicates:  No Known Allergies     Review of Systems:  Constitutional: No fever or chills, no weight changes.  Eyes: No visual changes or photophobia  HEENT: No nasal congestion or sore throat  Respiratory: No cough or shorness of breath  Cardiovascular: No chest pain or palpitations  Gastrointestinal: Good appetite, no nausea or vomiting, no change in bowel habits  Genitourinary: No hematuria or dysuria  Skin: No rash or pruritis  Hematologic/lymphatic: No easy bruising, bleeding or lymphadenopathy  Musculoskeletal: No arthralgias or myalgias  Neurological: No seizures or tremors  Endocrine: No heat/cold intolerance.  No polyuria/polydipsia.  Psychiatric:  No depression or anxiety.     OBJECTIVE:     Vital Signs (Most Recent)  Temp: 97.3 °F (36.3 °C) (03/22/22 1343)  Pulse: (!) 58 (03/22/22 1500)  Resp: 16 (03/22/22 1500)  BP: (!) 95/55 (03/22/22 1500)  SpO2: 96 % (03/22/22 1500)    Vital Signs Range (Last 24H):  Temp:  [97.3 °F (36.3 °C)-97.9 °F (36.6 °C)]   Pulse:  [58-66]   Resp:  [16-18]   BP: ()/(50-80)   SpO2:  [94 %-97 %]     Physical Exam:    General " appearance: Well developed, well nourished  Lungs: Clear to auscultation bilaterally and normal respiratory effort  Heart: Regular rate and rhythm, S1, S2 normal, no murmur, click, rub or gallop  Abdomen:  Obese, Soft, non-tender non-distended; bowel sounds normal; no masses,  no organomegaly  Extremities: No cyanosis or clubbing. No edema, right shoulder in sling  Pulses: 2+ and symmetric      Diagnostic Results:  Labs: Reviewed  X-Ray: Reviewed    ASSESSMENT/PLAN:     HTN  -On  Lisinopril 20 mg daily and sotalol 120 mg bid  -Will continue both with hold parameters.    P. A.Fib/ CAD/ Hx of NSTEMI  -was on ASA/ Eliquis prior to surgery and held x 48hrs, resume in am if okay with surgery.  -Cleared for surgery by Cards.  -Monitor of Telemetry.    DM2 with long term insulin use  -On oral agents Jardiance 25 mg daily and metformin  mg daily in addition to Lantus 44 units q am..  -apparently patient took all of his full dose Lantus as well as his oral agents this morning.  -will check Accu-Cheks and resume his usual medication ( minus Jardiance since not available) vif his blood sugar is not in the low range additional sliding scale will be ordered as well.    Hyperlipidemia  continue Lipitor 80 mg daily.    COPD/ Former smoker  -Does not use inhalers.    S/p right reverse shoulder Arthroplasty  -pain management and DVT prophylaxis per ortho    Thanks for consultation, will follow along with you.

## 2022-03-22 NOTE — ANESTHESIA PROCEDURE NOTES
Right Sup trunk    Patient location during procedure: holding area   Block not for primary anesthetic.  Reason for block: at surgeon's request and post-op pain management   Post-op Pain Location: right shoulder   Timeout: 3/22/2022 10:55 AM   End time: 3/22/2022 10:57 AM    Staffing  Authorizing Provider: Yonathan Todd MD  Performing Provider: Yonathan Todd MD    Preanesthetic Checklist  Completed: patient identified, IV checked, site marked, risks and benefits discussed, surgical consent, monitors and equipment checked, pre-op evaluation and timeout performed  Peripheral Block  Patient position: left lateral decubitus  Prep: ChloraPrep  Patient monitoring: heart rate, cardiac monitor, continuous pulse ox and frequent blood pressure checks  Block type: Sup Trunk  Laterality: right  Injection technique: single shot  Needle  Needle gauge: 21 G  Needle length: 4 in  Needle localization: ultrasound guidance and anatomical landmarks   -ultrasound image captured on disc.  Assessment  Injection assessment: negative aspiration and negative parasthesia  Paresthesia pain: none  Heart rate change: no  Slow fractionated injection: yes  Pain Tolerance: comfortable throughout block and no complaints  Medications:    Medications: ropivacaine (NAROPIN) injection 0.5% - Perineural, Right Interscalene   20 mL - 3/22/2022 10:57:00 AM

## 2022-03-22 NOTE — ANESTHESIA POSTPROCEDURE EVALUATION
Anesthesia Post Evaluation    Patient: Anastacio Michaels    Procedure(s) Performed: Procedure(s) (LRB):  ARTHROPLASTY, SHOULDER, TOTAL, REVERSE - RIGHT (Right)    Final Anesthesia Type: general      Patient location during evaluation: PACU  Patient participation: Yes- Able to Participate  Level of consciousness: awake and alert  Post-procedure vital signs: reviewed and stable  Pain management: adequate  Airway patency: patent    PONV status at discharge: No PONV  Anesthetic complications: no      Cardiovascular status: blood pressure returned to baseline  Respiratory status: unassisted, spontaneous ventilation and room air  Hydration status: euvolemic  Follow-up not needed.          Vitals Value Taken Time   BP 95/55 03/22/22 1500   Temp 36.3 °C (97.3 °F) 03/22/22 1343   Pulse 58 03/22/22 1500   Resp 16 03/22/22 1500   SpO2 96 % 03/22/22 1500         Event Time   Out of Recovery 15:05:00         Pain/Kimi Score: Pain Rating Prior to Med Admin: 7 (3/22/2022  2:21 PM)  Kimi Score: 9 (3/22/2022  3:00 PM)

## 2022-03-22 NOTE — PT/OT/SLP EVAL
Occupational Therapy   Evaluation and Treatment    Name: Anastacio Michaels  MRN: [id number]  Admitting Diagnosis:  <principal problem not specified>  Recent Surgery: Procedure(s) (LRB):  ARTHROPLASTY, SHOULDER, TOTAL, REVERSE - RIGHT (Right) Day of Surgery    Recommendations:     Discharge Recommendations:  (Home with Outpatient Therapy per MD)  Discharge Equipment Recommendations:  none  Barriers to discharge:  None    Assessment:     Anastacio Michaels is a 71 y.o. male s/p right rTSA.   He presents alert, oriented and wife present. Performance deficits affecting function: weakness, impaired self care skills, impaired balance, gait instability, decreased upper extremity function, edema, impaired skin, decreased ROM, impaired joint extensibility, orthopedic precautions.      Rehab Prognosis: Good; patient would benefit from acute skilled OT services to address these deficits and reach maximum level of function.       Plan:     Patient to be seen daily to address the above listed problems via self-care/home management, therapeutic activities, therapeutic exercises  · Plan of Care Expires: 03/23/22  · Plan of Care Reviewed with: patient, spouse    Subjective     Chief Complaint: None  Patient/Family Comments/goals:  To return to Indep level    Occupational Profile:  Living Environment: Lives in single story home with wife, walk-in shower  Previous level of function: Indep  Equipment Used at Home:  none  Assistance upon Discharge: Wife    Pain/Comfort:  · Pain Rating 1:  (Right UE is mostly numb.)    Patients cultural, spiritual, Scientologist conflicts given the current situation: no    Objective:     Communicated with: nurseJael, prior to session.  Patient found HOB elevated with peripheral IV, cryotherapy, hemovac, oxygen upon OT entry to room.    General Precautions: Standard, fall   Orthopedic Precautions:RUE non weight bearing (Exercises to be performed 3 X day for 5 min each time; may remove sling; no  abduction, no external rotation, NWB, and no active ROM on shoulder. Exercises: Pendulums, Elbow Motion, Ball Squeezes, Shoulder Shrugs, Shoulder Retractions, and ADL.)   Braces:  (Right UE immobilizer)  Respiratory Status: Nasal cannula, flow 1 L/min    Occupational Performance:    Bed Mobility:    · Supine to sit: SBA  · Sit to supine: Pt transferred into bed at SBA level, stayed in long sitting and OT elevated HOB to meet pt's back; pt and wife instructed for pt to not let his right shoulder drop back into shoulder extension when lying back in bed and make sure he has his immobilizer donned and he is holding his right UE with his left UE if going down to supine.    Functional Mobility/Transfers:  · Sit to stand: SBA with left UE for support during transition  · Transfers: Close SBA due to just getting out of surgery   · Functional Mobility: No LOB ambulating short distance in room, education of wife and pt on safety strategies    Activities of Daily Living:  · Feeding: Set up  · Grooming: Min A  · UB Dressing: Max A; Needing education and training on adaptive techniques for adhering to right shoulder precautions  · Education and training of pt and wife on correct donning and fit of cryotherapy on pt's right shoulder and precautions with use of cryotherapy  · Education and training of pt and wife on correct donning/doffing and correct fit of right shoulder immobilizer adhering to precuations  · LB Dressing: Min A for underwear and socks  · Toileting: Pt issued urinal and wife to assist him since he is on O2 and has PIV infusing; education on safety and pt not walking to the bathroom on his own or with wife at this time, call for nursing assist to manage IV pole and O2.    Cognitive/Visual Perceptual:  Intact; slightly impaired due to anesthesia but not impulsive or unsafe, oriented, follows commands 100%    Physical Exam:  Left UE AROM and strength is WFL  Right UE: Educated on precautions and HEP; support of  right UE due to numbness and to instruct wife in assisting pt with positioning and therapeutic exercises.  Sitting Balance: Good  Standing Balance: Good-  Sensation: Numbness right UE  Edema: Mild right shoulder      AMPAC 6 Click ADL:  AMPAC Total Score: 17    Treatment & Education:  Role of OT, POC, right UE NWB, right UE HEP, correct fit/donning of right UE immobilizer, correct fit and precautions with Polar Care, LB dressing techniques, safety strategies with immobization and numbness of right UE    Pt came to floor with Size Medium right UE immobilizer.  OT switched pt's immobilizer to an extra large for pt comfort and correct fit (wrist and hand support).    Education:    Patient left upright supported sitting  with all lines intact, call button in reach, bed alarm on, nurse notified, wife present and bilateral SCDs cycling, right UE immobilizer and cryotherapy.    GOALS:   Multidisciplinary Problems     Occupational Therapy Goals        Problem: Occupational Therapy    Goal Priority Disciplines Outcome Interventions   Occupational Therapy Goal     OT, PT/OT Ongoing, Progressing    Description: Goals to be met by: 3/23/22    Patient will increase functional independence with ADLs by performing:    Pt and wife will verbalize understanding of pt's right UE precautions and how to adhere to pt's precautions during ADL and ADL mobility.    Pt and wife will verbalize and demonstrate understanding of how cryotherapy fits on pt's right UE acknowledging precautions and purpose of cryotherapy.     Pt and wife will verbalize and demonstrate understanding of correct positioning of pt's right UE in immobilizer and how to position right UE when lying in bed/sitting in chair with immobilizer donned.    Pt and wife will verbalize and demonstrate understanding of pt's right UE HEP adhering to precautions.  Pt and wife will verbalize and demonstrate understanding of safety strategies to reduce pt fall risk during ADLs and ADL  mobility.                             History:     Past Medical History:   Diagnosis Date    Arthritis     Atrial fibrillation     Coronary artery disease     Diabetes mellitus     Hypertension     PONV (postoperative nausea and vomiting)        Past Surgical History:   Procedure Laterality Date    ANKLE SURGERY Left 1986    ARTHROPLASTY OF SHOULDER Left     COLONOSCOPY      several    CORONARY ANGIOPLASTY WITH STENT PLACEMENT      x2    HERNIA REPAIR      KNEE ARTHROSCOPY Bilateral     ROTATOR CUFF REPAIR Right        Time Tracking:     OT Date of Treatment: 03/22/22  OT Start Time: 1657  OT Stop Time: 1800  OT Total Time (min): 63 min    Billable Minutes:Evaluation 15  Self Care/Home Management 30  Therapeutic Exercise 18    3/22/2022

## 2022-03-22 NOTE — TRANSFER OF CARE
Anesthesia Transfer of Care Note    Patient: Anastacio Michaels    Procedure(s) Performed: Procedure(s) (LRB):  ARTHROPLASTY, SHOULDER, TOTAL, REVERSE - RIGHT (Right)    Patient location: PACU    Anesthesia Type: general    Transport from OR: Transported from OR on 2-3 L/min O2 by NC with adequate spontaneous ventilation    Post pain: adequate analgesia    Post assessment: no apparent anesthetic complications    Post vital signs: stable    Level of consciousness: awake and alert    Nausea/Vomiting: no nausea/vomiting    Complications: none    Transfer of care protocol was followed      Last vitals:   Visit Vitals  BP (!) 92/50 (BP Location: Left arm, Patient Position: Lying)   Pulse 66   Temp 36.3 °C (97.3 °F) (Skin)   Resp 16   Wt 104.3 kg (230 lb)   SpO2 (!) 94%   BMI 32.54 kg/m²

## 2022-03-23 VITALS
WEIGHT: 230 LBS | RESPIRATION RATE: 17 BRPM | HEIGHT: 70 IN | SYSTOLIC BLOOD PRESSURE: 120 MMHG | DIASTOLIC BLOOD PRESSURE: 67 MMHG | TEMPERATURE: 98 F | OXYGEN SATURATION: 98 % | BODY MASS INDEX: 32.93 KG/M2 | HEART RATE: 69 BPM

## 2022-03-23 LAB
POCT GLUCOSE: 108 MG/DL (ref 70–110)
POCT GLUCOSE: 199 MG/DL (ref 70–110)

## 2022-03-23 PROCEDURE — 25000003 PHARM REV CODE 250: Performed by: ORTHOPAEDIC SURGERY

## 2022-03-23 PROCEDURE — 97535 SELF CARE MNGMENT TRAINING: CPT

## 2022-03-23 PROCEDURE — 25000003 PHARM REV CODE 250: Performed by: INTERNAL MEDICINE

## 2022-03-23 PROCEDURE — 97110 THERAPEUTIC EXERCISES: CPT

## 2022-03-23 PROCEDURE — C9399 UNCLASSIFIED DRUGS OR BIOLOG: HCPCS | Performed by: INTERNAL MEDICINE

## 2022-03-23 PROCEDURE — 63600175 PHARM REV CODE 636 W HCPCS: Performed by: ORTHOPAEDIC SURGERY

## 2022-03-23 PROCEDURE — 97161 PT EVAL LOW COMPLEX 20 MIN: CPT

## 2022-03-23 RX ORDER — OXYCODONE AND ACETAMINOPHEN 7.5; 325 MG/1; MG/1
1 TABLET ORAL EVERY 4 HOURS PRN
Qty: 30 TABLET | Refills: 0 | Status: SHIPPED | OUTPATIENT
Start: 2022-03-23 | End: 2023-02-14 | Stop reason: CLARIF

## 2022-03-23 RX ADMIN — HYDROCODONE BITARTRATE AND ACETAMINOPHEN 1 TABLET: 10; 325 TABLET ORAL at 01:03

## 2022-03-23 RX ADMIN — CELECOXIB 200 MG: 200 CAPSULE ORAL at 08:03

## 2022-03-23 RX ADMIN — CEFAZOLIN SODIUM 2 G: 2 SOLUTION INTRAVENOUS at 03:03

## 2022-03-23 RX ADMIN — HYDROCODONE BITARTRATE AND ACETAMINOPHEN 1 TABLET: 10; 325 TABLET ORAL at 05:03

## 2022-03-23 RX ADMIN — MUPIROCIN 1 G: 20 OINTMENT TOPICAL at 08:03

## 2022-03-23 RX ADMIN — MORPHINE SULFATE 3 MG: 4 INJECTION INTRAVENOUS at 03:03

## 2022-03-23 RX ADMIN — FAMOTIDINE 20 MG: 20 TABLET ORAL at 08:03

## 2022-03-23 RX ADMIN — DOCUSATE SODIUM - SENNOSIDES 1 TABLET: 50; 8.6 TABLET, FILM COATED ORAL at 08:03

## 2022-03-23 RX ADMIN — SOTALOL HYDROCHLORIDE 120 MG: 80 TABLET ORAL at 08:03

## 2022-03-23 RX ADMIN — MORPHINE SULFATE 3 MG: 4 INJECTION INTRAVENOUS at 08:03

## 2022-03-23 RX ADMIN — INSULIN DETEMIR 40 UNITS: 100 INJECTION, SOLUTION SUBCUTANEOUS at 08:03

## 2022-03-23 NOTE — PLAN OF CARE
Problem: Physical Therapy  Goal: Physical Therapy Goal  Outcome: Met     PT orders received. Evaluation completed. Pt is independent for transfers and ambulation; reported no difficulty with bed mobility. Pt demonstrates normal gait pattern with lack of right arm swing due to shoulder abduction brace. No losses of balance or unsteadiness noted during ambulation. Pt expressed confidence in all aspects of her mobility. Pt will have assistance from wife at home. No acute PT needs identified at this time. Will d/c PT.

## 2022-03-23 NOTE — PLAN OF CARE
Initial Discharge Planning Assessment:  Patient admitted on: 3/22/22     Chart reviewed, Care plan discussed with treatment team,  attending Dr. Black     PCP updated in Epic: Yes  Pharmacy, updated in Epic: Bedside      DME at home: None       Current dispo: Home with family       Transportation: Family can provide      Power of  or Living Will:Family      Anticipated DC needs from CM perspective:       03/23/22 1144   Discharge Assessment   Assessment Type Discharge Planning Assessment   Confirmed/corrected address, phone number and insurance Yes   Confirmed Demographics Correct on Facesheet   Source of Information patient;health record   Lives With significant other   Do you expect to return to your current living situation? Yes   Do you have help at home or someone to help you manage your care at home? Yes   Prior to hospitilization cognitive status: Alert/Oriented   Current cognitive status: Alert/Oriented   Walking or Climbing Stairs Difficulty none   Dressing/Bathing Difficulty none   Equipment Currently Used at Home none   Readmission within 30 days? No   Patient currently being followed by outpatient case management? Unable to determine (comments)   Do you currently have service(s) that help you manage your care at home? No   Do you take prescription medications? Yes   Do you have prescription coverage? Yes   Coverage Medicare   Do you have any problems affording any of your prescribed medications? No   Is the patient taking medications as prescribed? yes   How do you get to doctors appointments? car, drives self;family or friend will provide   Are you on dialysis? No   Do you take coumadin? No   Discharge Plan A Home with family   Discharge Plan B Home with family   DME Needed Upon Discharge  none   Discharge Plan discussed with: Patient   Discharge Barriers Identified None

## 2022-03-23 NOTE — PT/OT/SLP PROGRESS
Occupational Therapy   Treatment and Discharge    Name: Anastacio Michaels  MRN: [id number]  Admitting Diagnosis:  <principal problem not specified>  1 Day Post-Op    Recommendations:     Discharge Recommendations:  (Home with Outpatient Therapy per MD)  Discharge Equipment Recommendations:  none  Barriers to discharge:  None    Assessment:     Anastacio Michaels is a 71 y.o. male with a medical diagnosis of <principal problem not specified>.  He presents with wife in room.  Pt and wife receptive to all education and training on pt's right UE precautions and how to adhere to precautions during ADL and ADL mobility, correct donning/fit of right UE immobilizer, right UE HEP and safety strategies to reduce fall risk. Performance deficits affecting function are impaired self care skills, edema, impaired skin, orthopedic precautions, impaired joint extensibility, decreased ROM, decreased upper extremity function.     Rehab Prognosis:  Good; patient would benefit from acute skilled OT services to address these deficits and reach maximum level of function.       Plan:     Patient to be seen daily to address the above listed problems via self-care/home management, therapeutic activities, therapeutic exercises  · Plan of Care Expires: 03/23/22  · Plan of Care Reviewed with: patient, spouse    Subjective     Pain/Comfort:  · Pain Rating 1:  (Did not report or rate pain.)    Objective:     Communicated with: nurseJael, prior to session.  Patient found sitting edge of bed with peripheral IV (Right UE immobilizer) upon OT entry to room.    General Precautions: Standard, fall   Orthopedic Precautions:RLE non weight bearing   Braces:  (Right UE immobilizer)  Respiratory Status: Room air     Occupational Performance:     Bed Mobility:    · NT today    Functional Mobility/Transfers:  · Sit to stand: Mod I  · Bed to chair transfer: Mod I  · Functional Mobility: Education on safety strategies to reduce pt fall risk due to  immobilized right UE    Activities of Daily Living:  · Grooming: Supervision at sink  · UB Dressing: Min A; education and training of pt and wife in techniques for donning/doffing button front shirt and T shirt adhering to pt's right UE precautions.  · LB Dressing: Min A; education on techniques to decrease fall risk and avoid bending over to reach feet on floor, cross foot over opposite knee to reach feet for LB self care  · Bathing: Education with OT demonstration of technique for pt washing underneath right UE (armpit) while adhering to his precautions.  Safety strategies to reduce fall risk      [facility] 6 Click ADL: 19    Treatment & Education:  Role of OT, POC, right UE precautions and how to adhere to precautions during ADL and ADL mobility, management of right UE immobilizer, right UE HEP, right UE positioning when sitting and lying, safety strategies to decrease fall risk, discharge recs      Patient left up in chair with all lines intact, call button in reach, nurse, Jael notified, wife present and right UE immobilizer donned.Education:      GOALS:   Multidisciplinary Problems     Occupational Therapy Goals     Not on file          Multidisciplinary Problems (Resolved)        Problem: Occupational Therapy    Goal Priority Disciplines Outcome Interventions   Occupational Therapy Goal   (Resolved)     OT, PT/OT Met    Description: Goals to be met by: 3/23/22    Patient will increase functional independence with ADLs by performing:    Pt and wife will verbalize understanding of pt's right UE precautions and how to adhere to pt's precautions during ADL and ADL mobility.    Pt and wife will verbalize and demonstrate understanding of how cryotherapy fits on pt's right UE acknowledging precautions and purpose of cryotherapy.     Pt and wife will verbalize and demonstrate understanding of correct positioning of pt's right UE in immobilizer and how to position right UE when lying in bed/sitting in chair with  immobilizer donned.    Pt and wife will verbalize and demonstrate understanding of pt's right UE HEP adhering to precautions.  Pt and wife will verbalize and demonstrate understanding of safety strategies to reduce pt fall risk during ADLs and ADL mobility.                             Time Tracking:     OT Date of Treatment: 03/23/22  OT Start Time: 0935  OT Stop Time: 1007  OT Total Time (min): 32 min    Billable Minutes:Self Care/Home Management 20  Therapeutic Exercise 12    OT/LANE: OT          3/23/2022

## 2022-03-23 NOTE — PLAN OF CARE
Problem: Occupational Therapy  Goal: Occupational Therapy Goal  Description: Goals to be met by: 3/23/22    Patient will increase functional independence with ADLs by performing:    Pt and wife will verbalize understanding of pt's right UE precautions and how to adhere to pt's precautions during ADL and ADL mobility.    Pt and wife will verbalize and demonstrate understanding of how cryotherapy fits on pt's right UE acknowledging precautions and purpose of cryotherapy.     Pt and wife will verbalize and demonstrate understanding of correct positioning of pt's right UE in immobilizer and how to position right UE when lying in bed/sitting in chair with immobilizer donned.    Pt and wife will verbalize and demonstrate understanding of pt's right UE HEP adhering to precautions.  Pt and wife will verbalize and demonstrate understanding of safety strategies to reduce pt fall risk during ADLs and ADL mobility.            Outcome: Met

## 2022-03-23 NOTE — PROGRESS NOTES
"MED  Progress Note    Admit Date: 3/22/2022   LOS: 0 days     SUBJECTIVE:     Follow-up For:  Med Mgmt    Interval History:     Uneventful night.  Eating breakfast.  Discussed with wife at bedside.  No CP/SOB/Calf pain.      Review of Systems:  Constitutional: No fever or chills  Respiratory: No cough or shortness of breath  Cardiovascular: No chest pain or palpitations  Gastrointestinal: No nausea or vomiting  Neurological: No confusion or weakness    OBJECTIVE:     Vital Signs Range (Last 24H):  /67   Pulse 69   Temp 97.7 °F (36.5 °C)   Resp 17   Ht 5' 10" (1.778 m)   Wt 104.3 kg (230 lb)   SpO2 98%   BMI 33.00 kg/m²     Temp:  [97.3 °F (36.3 °C)-97.9 °F (36.6 °C)]   Pulse:  [51-69]   Resp:  [14-18]   BP: ()/(50-80)   SpO2:  [94 %-98 %]     I & O (Last 24H):    Intake/Output Summary (Last 24 hours) at 3/23/2022 0930  Last data filed at 3/23/2022 0325  Gross per 24 hour   Intake 3131.28 ml   Output 530 ml   Net 2601.28 ml       Physical Exam:  General appearance: Well developed, well nourished  Eyes:  Conjunctivae/corneas clear. PERRL.  Lungs: Normal respiratory effort,   clear to auscultation bilaterally   Heart: Regular rate and rhythm, S1, S2 normal, no murmur, rub or kaylene.  Abdomen: Soft, non-tender non-distended; bowel sounds normal; no masses,  no organomegaly, obese  Extremities: No cyanosis or clubbing. No edema.    Skin: Skin color, texture, turgor normal. No rashes or lesions  Neurologic: Normal strength and tone. No focal numbness or weakness   Right shoulder sling    Laboratory Data:  No results for input(s): WBC, RBC, HGB, HCT, PLT, MCV, MCH, MCHC in the last 24 hours.    BMP:   Recent Labs   Lab 03/17/22  0930   *      K 4.8      CO2 24   BUN 21   CREATININE 0.9   CALCIUM 9.5     Lab Results   Component Value Date    CALCIUM 9.5 03/17/2022    PHOS 3.7 08/13/2012       Lab Results   Component Value Date    CALCIUM 9.5 03/17/2022    PHOS 3.7 08/13/2012       No " results found for: URICACID    BNP  No results for input(s): BNP, BNPTRIAGEBLO in the last 168 hours.    Medications:  Medication list was reviewed and changes noted under Assessment/Plan.    Diagnostic Results:        ASSESSMENT/PLAN:     HTN  -On  Lisinopril 20 mg daily and sotalol 120 mg bid  -Will continue both with hold parameters.  -encourage oral intake.        P. A.Fib/ CAD/ Hx of NSTEMI  -was on ASA/ Eliquis prior to surgery and held x 48hrs, resume today if okay with surgery.  -Cleared for surgery by Cards.  -Monitored of Telemetry.       DM2 with long term insulin use  -On oral agents Jardiance 25 mg daily and metformin  mg daily in addition to Lantus 44 units q am..  -apparently patient took all of his full dose Lantus as well as his oral agents morning of surgery.  -sugars a little high with D5 mixed in antibx and IVF's.    -resume home regimen.        Hyperlipidemia  continue Lipitor 80 mg daily.       COPD/ Former smoker  -Does not use inhalers.       S/p right reverse shoulder Arthroplasty  -pain management and DVT prophylaxis per ortho         See above  Ok to DC.

## 2022-03-23 NOTE — PLAN OF CARE
VSS and oriented X4. BP monitored closely. RUE in arm sling with polar care in place. Pain controlled with PRN medications. No complaints of nausea. Continuous fluids infusing. Patient ambulating independently, stand by assist. Spouse at bedside, attentive and supportive of patient. Will continue to monitor.    0627- Patient requested to removed Polar Care/SCDs this AM for comfort.     Problem: Adult Inpatient Plan of Care  Goal: Optimal Comfort and Wellbeing  Outcome: Ongoing, Progressing     Problem: Diabetes Comorbidity  Goal: Blood Glucose Level Within Targeted Range  Outcome: Ongoing, Progressing     Problem: Pain (Shoulder Arthroplasty)  Goal: Acceptable Pain Control  Outcome: Ongoing, Progressing     Problem: Fall Injury Risk  Goal: Absence of Fall and Fall-Related Injury  Outcome: Ongoing, Progressing

## 2022-03-23 NOTE — PLAN OF CARE
Problem: Adult Inpatient Plan of Care  Goal: Plan of Care Review  Outcome: Met  Goal: Patient-Specific Goal (Individualized)  Outcome: Met  Goal: Absence of Hospital-Acquired Illness or Injury  Outcome: Met  Goal: Optimal Comfort and Wellbeing  Outcome: Met  Goal: Readiness for Transition of Care  Outcome: Met     Problem: Diabetes Comorbidity  Goal: Blood Glucose Level Within Targeted Range  Outcome: Met     Problem: Adjustment to Surgery (Shoulder Arthroplasty)  Goal: Optimal Coping  Outcome: Met     Problem: Bleeding (Shoulder Arthroplasty)  Goal: Absence of Bleeding  Outcome: Met     Problem: Bowel Motility Impaired (Shoulder Arthroplasty)  Goal: Effective Bowel Elimination  Outcome: Met     Problem: Fluid and Electrolyte Imbalance (Shoulder Arthroplasty)  Goal: Fluid and Electrolyte Balance  Outcome: Met     Problem: Functional Ability Impaired (Shoulder Arthroplasty)  Goal: Optimal Functional Ability  Outcome: Met     Problem: Infection (Shoulder Arthroplasty)  Goal: Absence of Infection Signs and Symptoms  Outcome: Met     Problem: Neurovascular Compromise (Shoulder Arthroplasty)  Goal: Intact Neurovascular Status  Outcome: Met     Problem: Ongoing Anesthesia Effects (Shoulder Arthroplasty)  Goal: Anesthesia/Sedation Recovery  Outcome: Met     Problem: Pain (Shoulder Arthroplasty)  Goal: Acceptable Pain Control  Outcome: Met     Problem: Postoperative Nausea and Vomiting (Shoulder Arthroplasty)  Goal: Nausea and Vomiting Relief  Outcome: Met     Problem: Postoperative Urinary Retention (Shoulder Arthroplasty)  Goal: Effective Urinary Elimination  Outcome: Met     Problem: Fall Injury Risk  Goal: Absence of Fall and Fall-Related Injury  Outcome: Met

## 2022-03-23 NOTE — PLAN OF CARE
Patient  states he lives independently at home with wife.     Family will provide transportation home.    No DC needs from CM perspective.         03/23/22 1147   Final Note   Assessment Type Final Discharge Note   Anticipated Discharge Disposition Home   Hospital Resources/Appts/Education Provided Provided patient/caregiver with written discharge plan information;Appointments scheduled by Navigator/Coordinator   Post-Acute Status   Patient choice form signed by patient/caregiver List with quality metrics by geographic area provided   Discharge Delays None known at this time

## 2022-03-23 NOTE — PT/OT/SLP EVAL
Physical Therapy Evaluation and Discharge Note    Patient Name:  Anastacio Michaels   MRN:  [id number]    Recommendations:     Discharge Recommendations:   (home with outpatient therapy per MD)   Discharge Equipment Recommendations: none   Barriers to discharge: None    Assessment:     Anastacio Michaels is a 71 y.o. male admitted s/p right total shoulder athroplasty. He has a past medical history that includes but is not limited to OA, A-fib, CAD, DM, HTN, PONV, L TSA, and left ankle surgery.  At this time, patient is functioning at their prior level of function and does not require further acute PT services.     PT orders received. Evaluation completed. Pt is independent for transfers and ambulation; reported no difficulty with bed mobility. Pt demonstrates normal gait pattern with lack of right arm swing due to shoulder abduction brace. No losses of balance or unsteadiness noted during ambulation. Pt expressed confidence in all aspects of her mobility. Pt will have assistance from wife at home. No acute PT needs identified at this time. Will d/c PT.    Recent Surgery: Procedure(s) (LRB):  ARTHROPLASTY, SHOULDER, TOTAL, REVERSE - RIGHT (Right) 1 Day Post-Op    Plan:     During this hospitalization, patient does not require further acute PT services.  Please re-consult if situation changes.      Subjective     Chief Complaint: right shoulder pain  Patient/Family Comments/goals: Pt noted he is ambulating with confidence and has no concerns with functional mobility from a PT standpoint.  Pain/Comfort:  · Pain Rating 1: 5/10  · Location - Side 1: Right  · Location - Orientation 1: generalized  · Location 1: shoulder  · Pain Addressed 1: Pre-medicate for activity, Distraction    Patients cultural, spiritual, Cheondoism conflicts given the current situation: no    Living Environment:  Pt lives with wife in a [facility] with threshold step to enter. He has access to a walk-in shower in the home. Prior to admission,  patients level of function was independent and ambulatory.  Equipment used at home: none.  DME owned (not currently used): none.  Upon discharge, patient will have assistance from wife.    Objective:     Communicated with LAMAR Elder prior to session.  Patient found ambulatory in room/soler with peripheral IV, cryotherapy upon PT entry to room.    General Precautions: Standard, fall   Orthopedic Precautions:RUE non weight bearing   Braces:  (RUE immobilizer)   Respiratory Status: Room air    Exams:  · Cognition:   · Patient is oriented to person, place, time, and situation.  · Pt follows approximately 100% of multiple-step commands.    · Mood: Pleasant and cooperative.  · Musculoskeletal:  · Posture: Protective guarding surgical joint. Surgical joint immobilized in shoulder abduction brace.  · LE ROM/Strength: Bilateral AROM/PROM within normal limits. 5/5 strength for bilateral hip flexion, knee extension, and ankle dorsiflexion.  · UE ROM/Strength: Nonsurgical UE strength/ROM within functional limits. Surgical joint not tested.  strength on surgical side within functional limits  · Neuromuscular:  · Sensation: Intact to light touch bilateral LEs. Pt denied paresthesias.   · Coordination/Tone/Reflexes: No impairments identified with functional mobility. No formal testing performed.   · Balance: independent static and dynamic standing without upper extremity support.   · Visual-vestibular: No impairments identified with functional mobility. No formal testing performed.  · Integument: Visible skin intact and surgical extremity dressing clean and dry.   · Cardiopulmonary:  · Edema: Mild surgical extremity.    · Color/temperature/pulses: WNL    Functional Mobility:  · Bed Mobility:     · Not performed - pt confident with task and was ambulating in room at time of evaluation.   · Transfers:     · Sit to Stand: modified independence with no AD  · Gait: 50 ft with modified independence and no AD  · Demonstrated wide  ANGELA, decreased michelle, and lack of arm swing secondary to shoulder abduction brace on surgical extremity.    AM-PAC 6 CLICK MOBILITY  Total Score:23     PT educated patient re:   · Discharge from PT  · Safety with OOB mobility  · Activity recommendations  · Discharge recommendations   Pt verbalized understanding     Patient left sitting edge of bed with all lines intact, call button in reach and wife present.    GOALS:   Multidisciplinary Problems     Physical Therapy Goals     Not on file          Multidisciplinary Problems (Resolved)        Problem: Physical Therapy    Goal Priority Disciplines Outcome Goal Variances Interventions   Physical Therapy Goal   (Resolved)     PT, PT/OT Met                     History:     Past Medical History:   Diagnosis Date    Arthritis     Atrial fibrillation     Coronary artery disease     Diabetes mellitus     Hypertension     PONV (postoperative nausea and vomiting)        Past Surgical History:   Procedure Laterality Date    ANKLE SURGERY Left 1986    ARTHROPLASTY OF SHOULDER Left     COLONOSCOPY      several    CORONARY ANGIOPLASTY WITH STENT PLACEMENT      x2    HERNIA REPAIR      KNEE ARTHROSCOPY Bilateral     ROTATOR CUFF REPAIR Right        Time Tracking:     PT Received On: 03/23/22  PT Start Time: 0917     PT Stop Time: 0925  PT Total Time (min): 8 min     Billable Minutes: Evaluation 8      03/23/2022

## 2022-09-13 PROBLEM — M25.511 RIGHT SHOULDER PAIN: Status: ACTIVE | Noted: 2022-09-13

## 2022-09-13 PROBLEM — R52 PAIN AGGRAVATED BY ACTIVITIES OF DAILY LIVING: Status: ACTIVE | Noted: 2022-09-13

## 2022-09-13 PROBLEM — M25.611 DECREASED RIGHT SHOULDER RANGE OF MOTION: Status: ACTIVE | Noted: 2022-09-13

## 2022-10-05 ENCOUNTER — DOCUMENTATION ONLY (OUTPATIENT)
Dept: REHABILITATION | Facility: HOSPITAL | Age: 72
End: 2022-10-05
Payer: MEDICARE

## 2022-10-05 NOTE — PROGRESS NOTES
PT/PTA met face to face to discuss pt's treatment plan and progress towards established goals. Pt will be seen by a physical therapist minimally every 6th visit or every 30 days.      Jama Smith, PT

## 2023-02-03 NOTE — H&P
[facility] Surgery ([place])  History & Physical    Subjective:      Progressive pain crepitance left knee intolerable admitted for left knee replacement significant risk discussed    Patient Active Problem List    Diagnosis Date Noted    Pain aggravated by activities of daily living 09/13/2022    Decreased right shoulder range of motion 09/13/2022    Right shoulder pain 09/13/2022    CAD (coronary artery disease) 06/11/2015    PAF (paroxysmal atrial fibrillation) 06/11/2015    DM (diabetes mellitus) 06/11/2015    Hypertension 06/11/2015    Ex-smoker 06/11/2015     Past Medical History:   Diagnosis Date    Arthritis     Atrial fibrillation     Coronary artery disease     Diabetes mellitus     Hypertension     PONV (postoperative nausea and vomiting)      Past Surgical History:   Procedure Laterality Date    ANKLE SURGERY Left 1986    ARTHROPLASTY OF SHOULDER Left     COLONOSCOPY      several    CORONARY ANGIOPLASTY WITH STENT PLACEMENT      x2    HERNIA REPAIR      KNEE ARTHROSCOPY Bilateral     REVERSE TOTAL SHOULDER ARTHROPLASTY Right 3/22/2022    Procedure: ARTHROPLASTY, SHOULDER, TOTAL, REVERSE - RIGHT;  Surgeon: Claude S. Williams IV, MD;  Location: [facility];  Service: Orthopedics;  Laterality: Right;    ROTATOR CUFF REPAIR Right      No medications prior to admission.     Review of patient's allergies indicates:  No Known Allergies  Social History     Tobacco Use    Smoking status: Every Day     Packs/day: 1.00     Types: Cigarettes    Smokeless tobacco: Never   Substance Use Topics    Alcohol use: Yes     No family history on file.  Social History     Tobacco Use    Smoking status: Every Day     Packs/day: 1.00     Types: Cigarettes    Smokeless tobacco: Never   Substance Use Topics    Alcohol use: Yes    Drug use: Never       No medications prior to admission.     Review of patient's allergies indicates:  No Known Allergies     Review of Systems:  All other systems reviewed and are negative.  .    No current  "facility-administered medications for this encounter.     Current Outpatient Medications   Medication Sig    atorvastatin (LIPITOR) 80 MG tablet     BD ULTRA-FINE SHORT PEN NEEDLE 31 gauge x 5/16" Ndle     FLUVIRIN 5667-7671 45 mcg (15 mcg x 3)/0.5 mL Susp     glucosamine-chondroitin 500-400 mg tablet Take 1 tablet by mouth 3 (three) times daily.    JARDIANCE 25 mg tablet     LANTUS SOLOSTAR U-100 INSULIN glargine 100 units/mL (3mL) SubQ pen     lisinopriL (PRINIVIL,ZESTRIL) 20 MG tablet     metFORMIN (GLUCOPHAGE-XR) 750 MG ER 24hr tablet     multivitamin (THERAGRAN) per tablet Take 1 tablet by mouth once daily.    naproxen sodium (ANAPROX) 220 MG tablet Take 220 mg by mouth 2 (two) times daily with meals.    oxyCODONE-acetaminophen (PERCOCET) 7.5-325 mg per tablet Take 1 tablet by mouth every 4 (four) hours as needed for Pain (Post-op Pain).    sotaloL (BETAPACE) 120 MG Tab     vits A,C,E/lutein/minerals (HEALTHY EYES ORAL) Take by mouth.       Objective:      Vital Signs (Most Recent)       Vital Signs Range (Last 24H):  BP: ()/()   Arterial Line BP: ()/()     Physical Exam heart regular lungs clear crepitance tenderness left knee with effusion    Imaging Review:   Loss of joint space sclerosis osteophyte formation      Assessment:      There are no hospital problems to display for this patient.      Plan:    The various methods of treatment have been discussed with the patient and family.   After consideration of risks, benefits and other options for treatment, the patient has consented to surgical interventions (significant risk discussed).  Questions were answered and Pre-op teaching was done by me.    "

## 2023-02-03 NOTE — DISCHARGE INSTRUCTIONS
Knee Athroplasty Discharge Instructions    Pain:   After surgery your knee will be sore. The knee will likely have been injected with a numbing medicine (Exparel) prior to completion of surgery for pain control. This is indicated on a green bracelet that you will continue to wear for 4 days after surgery. Ice and elevation will assist with pain control.    Apply ice as much as possible for the first 72 hours. After 72 hours, apply ice for 20minutes after therapy or whenever experiencing pain. Avoid direct skin contact with ice to prevent frostbit.           Elevate the affected leg with the pillow the length of the leg higher than your heart to assist with swelling and pain.  Incision Care:   Some drainage from the incision in the first 72 hours is normal. If drainage is excessive, reinforce dressing and call home health nurse or therapist. Keep incision clean, dry and covered until staples removed. Staples will be removed 14-21 days after surgery .    Activity:                  Perform exercises 2 x day.   You may shower 48 hours after surgery  providing the dressing is waterproof. You can wrap the knee or hip with press n seal saran wrap to assist with keeping the dressing dry while showering. Dr. Holguin prefers his patients to use [place] Cast Cover when showering.No tub or hot tub usage for 6 weeks after surgery. Support help is mandatory during showering. If the  dressing becomes wet, replace with a new dressing. Do not leave a wet dressing on.   Wear easton hose to both extremities  for 3 weeks after surgery .You may remove stockings for 1- 2 hours during the day only.            If your physician orders the CPM machine you are to use it for 2 hours in the am and 2 hours in the pm. This is not to replace your exercise program.    You may need antibiotic coverage before dental or minor surgical procedures.  Possible Complication:  Infections: Report these signs and symptoms to your surgeon:  Unexpected redness  around incision  Persistent drainage from wound after 72 hours  Temperature Greater than 101 degrees F  Additional swelling  Pain not controlled with current pain medication  Blood Clot: Report these signs and symptoms to your surgeon:  Unusual pain, unusual warm skin  Red or discolored skin  Swelling in the leg

## 2023-02-10 NOTE — H&P
History and physical addendum.  Patient complained more of his right knee than his left knee.  Regular right knee replacements is more symptomatic on the right.  He has bone-on-bone varus deformity.  Both knees are significantly arthritic.  We will do the right knee because is more symptomatic at this time.

## 2023-02-14 ENCOUNTER — HOSPITAL ENCOUNTER (OUTPATIENT)
Dept: PREADMISSION TESTING | Facility: OTHER | Age: 73
Discharge: HOME | End: 2023-02-14
Attending: ORTHOPAEDIC SURGERY
Payer: MEDICARE

## 2023-02-14 ENCOUNTER — ANESTHESIA EVENT (OUTPATIENT)
Dept: SURGERY | Facility: OTHER | Age: 73
End: 2023-02-14
Payer: MEDICARE

## 2023-02-14 VITALS
WEIGHT: 231 LBS | OXYGEN SATURATION: 96 % | DIASTOLIC BLOOD PRESSURE: 62 MMHG | SYSTOLIC BLOOD PRESSURE: 118 MMHG | HEART RATE: 72 BPM | BODY MASS INDEX: 33.15 KG/M2 | TEMPERATURE: 98 F

## 2023-02-14 DIAGNOSIS — Z01.818 PREOP TESTING: Primary | ICD-10-CM

## 2023-02-14 LAB
BACTERIA #/AREA URNS HPF: NORMAL /HPF
BASOPHILS # BLD AUTO: 0.02 K/UL (ref 0–0.2)
BASOPHILS NFR BLD: 0.3 % (ref 0–1.9)
BILIRUB UR QL STRIP: NEGATIVE
CLARITY UR: CLEAR
COLOR UR: YELLOW
DIFFERENTIAL METHOD: ABNORMAL
EOSINOPHIL # BLD AUTO: 0.1 K/UL (ref 0–0.5)
EOSINOPHIL NFR BLD: 1.4 % (ref 0–8)
ERYTHROCYTE [DISTWIDTH] IN BLOOD BY AUTOMATED COUNT: 13.2 % (ref 11.5–14.5)
GLUCOSE UR QL STRIP: ABNORMAL
HCT VFR BLD AUTO: 49.2 % (ref 40–54)
HGB BLD-MCNC: 16.4 G/DL (ref 14–18)
HGB UR QL STRIP: NEGATIVE
IMM GRANULOCYTES # BLD AUTO: 0.04 K/UL (ref 0–0.04)
IMM GRANULOCYTES NFR BLD AUTO: 0.6 % (ref 0–0.5)
KETONES UR QL STRIP: NEGATIVE
LEUKOCYTE ESTERASE UR QL STRIP: NEGATIVE
LYMPHOCYTES # BLD AUTO: 2 K/UL (ref 1–4.8)
LYMPHOCYTES NFR BLD: 32 % (ref 18–48)
MCH RBC QN AUTO: 34.1 PG (ref 27–31)
MCHC RBC AUTO-ENTMCNC: 33.3 G/DL (ref 32–36)
MCV RBC AUTO: 102 FL (ref 82–98)
MICROSCOPIC COMMENT: NORMAL
MONOCYTES # BLD AUTO: 0.6 K/UL (ref 0.3–1)
MONOCYTES NFR BLD: 9.7 % (ref 4–15)
NEUTROPHILS # BLD AUTO: 3.5 K/UL (ref 1.8–7.7)
NEUTROPHILS NFR BLD: 56 % (ref 38–73)
NITRITE UR QL STRIP: NEGATIVE
NRBC BLD-RTO: 0 /100 WBC
PH UR STRIP: 6 [PH] (ref 5–8)
PLATELET # BLD AUTO: 166 K/UL (ref 150–450)
PMV BLD AUTO: 10.3 FL (ref 9.2–12.9)
PROT UR QL STRIP: NEGATIVE
RBC # BLD AUTO: 4.81 M/UL (ref 4.6–6.2)
SP GR UR STRIP: 1.02 (ref 1–1.03)
URN SPEC COLLECT METH UR: ABNORMAL
UROBILINOGEN UR STRIP-ACNC: NEGATIVE EU/DL
WBC # BLD AUTO: 6.31 K/UL (ref 3.9–12.7)
YEAST URNS QL MICRO: NORMAL

## 2023-02-14 PROCEDURE — 36415 COLL VENOUS BLD VENIPUNCTURE: CPT | Performed by: ORTHOPAEDIC SURGERY

## 2023-02-14 PROCEDURE — 93010 ELECTROCARDIOGRAM REPORT: CPT | Mod: ,,, | Performed by: INTERNAL MEDICINE

## 2023-02-14 PROCEDURE — 81000 URINALYSIS NONAUTO W/SCOPE: CPT | Performed by: ORTHOPAEDIC SURGERY

## 2023-02-14 PROCEDURE — 85025 COMPLETE CBC W/AUTO DIFF WBC: CPT | Performed by: ORTHOPAEDIC SURGERY

## 2023-02-14 PROCEDURE — 93010 EKG 12-LEAD: ICD-10-PCS | Mod: ,,, | Performed by: INTERNAL MEDICINE

## 2023-02-14 PROCEDURE — 93005 ELECTROCARDIOGRAM TRACING: CPT

## 2023-02-14 RX ORDER — LIDOCAINE HYDROCHLORIDE 10 MG/ML
1 INJECTION, SOLUTION EPIDURAL; INFILTRATION; INTRACAUDAL; PERINEURAL ONCE
Status: CANCELLED | OUTPATIENT
Start: 2023-02-14 | End: 2023-02-14

## 2023-02-14 RX ORDER — ALBUTEROL SULFATE 2.5 MG/.5ML
2.5 SOLUTION RESPIRATORY (INHALATION)
Status: CANCELLED | OUTPATIENT
Start: 2023-02-14 | End: 2023-02-14

## 2023-02-14 RX ORDER — ACETAMINOPHEN 500 MG
1000 TABLET ORAL
Status: CANCELLED | OUTPATIENT
Start: 2023-02-14 | End: 2023-02-14

## 2023-02-14 RX ORDER — ASPIRIN 81 MG/1
81 TABLET ORAL DAILY
COMMUNITY

## 2023-02-14 NOTE — ANESTHESIA PREPROCEDURE EVALUATION
02/14/2023  Anastacio Michaels is a 72 y.o., male.      Pre-op Assessment    I have reviewed the Patient Summary Reports.     I have reviewed the Nursing Notes. I have reviewed the NPO Status.   I have reviewed the Medications.     Review of Systems  Anesthesia Hx:  No problems with previous Anesthesia  History of prior surgery of interest to airway management or planning: Denies Family Hx of Anesthesia complications.   Denies Personal Hx of Anesthesia complications.   Social:  Smoker 1 ppd   Hematology/Oncology:  Hematology Normal   Oncology Normal     EENT/Dental:EENT/Dental Normal   Cardiovascular:   Hypertension CAD  CABG/stent Dysrhythmias atrial fibrillation Stents 6 yrs ago,hx a fib now NSR   Pulmonary:  Pulmonary Normal    Renal/:  Renal/ Normal     Hepatic/GI:  Hepatic/GI Normal    Musculoskeletal:   Arthritis     Neurological:  Neurology Normal    Endocrine:   Diabetes    Dermatological:  Skin Normal        Physical Exam  General: Cooperative, Alert and Oriented    Airway:  Mallampati: II   Mouth Opening: Normal  Neck ROM: Normal ROM    Dental:  Partial Dentures        Anesthesia Plan  Type of Anesthesia, risks & benefits discussed:    Anesthesia Type: Spinal  Intra-op Monitoring Plan: Standard ASA Monitors  Post Op Pain Control Plan: multimodal analgesia and peripheral nerve block  Informed Consent: Informed consent signed with the Patient and all parties understand the risks and agree with anesthesia plan.  All questions answered.   ASA Score: 3  Anesthesia Plan Notes: CBC,EKG,will check w cardiologist and Stp Eliquis 3 days before surg,plan SAB    Ready For Surgery From Anesthesia Perspective.     .

## 2023-02-14 NOTE — DISCHARGE INSTRUCTIONS
Information to Prepare you for your Surgery    PRE-ADMIT TESTING -  [phone]    [address] [facility]          Your surgery has been scheduled at [facility]. We are pleased to have the opportunity to serve you. For Further Information please call [phone].    On the day of surgery please report to the Information Desk on the 1st floor.    CONTACT YOUR PHYSICIAN'S OFFICE THE DAY PRIOR TO YOUR SURGERY TO OBTAIN YOUR ARRIVAL TIME.     The evening before surgery do not eat anything after 9 p.m. ( this includes hard candy, chewing gum and mints).  You may only have GATORADE, POWERADE AND WATER  from 9 p.m. until you leave your home.   DO NOT DRINK ANY LIQUIDS ON THE WAY TO THE HOSPITAL.      Why does your anesthesiologist allow you to drink Gatorade/Powerade before surgery?  Gatorade/Powerade helps to increase your comfort before surgery and to decrease your nausea after surgery. The carbohydrates in Gatorade/Powerade help reduce your body's stress response to surgery.  If you are a diabetic-drink only water prior to surgery.      Current Visitor policy(12/27/2021) - Patients may have 2 visitors pre and post procedure. Only 2 visitors will be allowed in the Surgical building with the patient. No one under the age of 12 will be allowed into the facility.    SPECIAL MEDICATION INSTRUCTIONS: TAKE medications checked off by the Anesthesiologist on your Medication List.    Angiogram Patients: Take medications as instructed by your physician, including aspirin.     Surgery Patients:    If you take ASPIRIN - Your PHYSICIAN/SURGEON will need to inform you IF/OR when you need to stop taking aspirin prior to your surgery.     The week prior to surgery do not ot take any medications containing IBUPROFEN or NSAIDS ( Advil, Motrin, Goodys, BC, Aleve, Naproxen etc) If you are not sure if you should take a medicine please call your surgeon's office.  Ok to take  Tylenol    Do Not Wear any make-up (especially eye make-up) to surgery. Please remove any false eyelashes or eyelash extensions. If you arrive the day of surgery with makeup/eyelashes on you will be required to remove prior to surgery. (There is a risk of corneal abrasions if eye makeup/eyelash extensions are not removed)      Leave all valuables at home.   Do Not wear any jewelry or watches, including any metal in body piercings. Jewelry must be removed prior to coming to the hospital.  There is a possibility that rings that are unable to be removed may be cut off if they are on the surgical extremity.    Please remove all hair extensions, wigs, clips and any other metal accessories/ ornaments from your hair.  These items may pose a flammable/fire risk in Surgery and must be removed.    Do not shave your surgical area at least 5 days prior to your surgery. The surgical prep will be performed at the hospital according to Infection Control regulations.    Contact Lens must be removed before surgery. Either do not wear the contact lens or bring a case and solution for storage.  Please bring a container for eyeglasses or dentures as required.  Bring any paperwork your physician has provided, such as consent forms,  history and physicals, doctor's orders, etc.   Bring comfortable clothes that are loose fitting to wear upon discharge. Take into consideration the type of surgery being performed.  Maintain your diet as advised per your physician the day prior to surgery.      Adequate rest the night before surgery is advised.   Park in the Parking lot behind the hospital or in the [facility] across the street from the parking lot. Parking is complimentary.  If you will be discharged the same day as your procedure, please arrange for a responsible adult to drive you home or to accompany you if traveling by taxi.   YOU WILL NOT BE PERMITTED TO DRIVE OR TO LEAVE THE HOSPITAL ALONE AFTER SURGERY.   If you are  being discharged the same day, it is strongly recommended that you arrange for someone to remain with you for the first 24 hrs following your surgery.    The Surgeon will speak to your family/visitor after your surgery regarding the outcome of your surgery and post op care.  The Surgeon may speak to you after your surgery, but there is a possibility you may not remember the details.  Please check with your family members regarding the conversation with the Surgeon.    We strongly recommend whoever is bringing you home be present for discharge instructions.  This will ensure a thorough understanding for your post op home care.    ALL CHILDREN MUST ALWAYS BE ACCOMPANIED BY AN ADULT.    Visitors-Refer to current Visitor policy handouts.    Thank you for your cooperation.  The Staff of [facility].            Bathing Instructions with Hibiclens    Shower the evening before and morning of your procedure with Chlorhexidine (Hibiclens)  do not use Chlorhexidine on your face or genitals. Do not get in your eyes.  Wash your face with water and your regular face wash/soap  Use your regular shampoo  Apply Chlorhexidine (Hibiclens) directly on your skin or on a wet washcloth and wash gently. When showering: Move away from the shower stream when applying Chlorhexidine (Hibiclens) to avoid rinsing off too soon.  Rinse thoroughly with warm water  Do not dilute Chlorhexidine (Hibiclens)   Dry off as usual, do not use any deodorant, powder, body lotions, perfume, after shave or cologne.

## 2023-02-23 ENCOUNTER — ANESTHESIA (OUTPATIENT)
Dept: SURGERY | Facility: OTHER | Age: 73
End: 2023-02-23
Payer: MEDICARE

## 2023-02-23 ENCOUNTER — HOSPITAL ENCOUNTER (OUTPATIENT)
Facility: OTHER | Age: 73
Discharge: HOME HEALTH | End: 2023-02-24
Attending: ORTHOPAEDIC SURGERY | Admitting: ORTHOPAEDIC SURGERY
Payer: MEDICARE

## 2023-02-23 DIAGNOSIS — M17.9 OA (OSTEOARTHRITIS) OF KNEE: ICD-10-CM

## 2023-02-23 DIAGNOSIS — Z01.818 PRE-OP TESTING: Primary | ICD-10-CM

## 2023-02-23 DIAGNOSIS — M17.11 OSTEOARTHRITIS OF RIGHT KNEE: ICD-10-CM

## 2023-02-23 LAB
POCT GLUCOSE: 124 MG/DL (ref 70–110)
POCT GLUCOSE: 131 MG/DL (ref 70–110)
POCT GLUCOSE: 228 MG/DL (ref 70–110)
POCT GLUCOSE: 248 MG/DL (ref 70–110)

## 2023-02-23 PROCEDURE — 63600175 PHARM REV CODE 636 W HCPCS: Performed by: NURSE ANESTHETIST, CERTIFIED REGISTERED

## 2023-02-23 PROCEDURE — 25000003 PHARM REV CODE 250: Performed by: ANESTHESIOLOGY

## 2023-02-23 PROCEDURE — 25000242 PHARM REV CODE 250 ALT 637 W/ HCPCS: Performed by: ANESTHESIOLOGY

## 2023-02-23 PROCEDURE — 99900035 HC TECH TIME PER 15 MIN (STAT)

## 2023-02-23 PROCEDURE — 63600175 PHARM REV CODE 636 W HCPCS: Performed by: ANESTHESIOLOGY

## 2023-02-23 PROCEDURE — 25000003 PHARM REV CODE 250: Performed by: ORTHOPAEDIC SURGERY

## 2023-02-23 PROCEDURE — C1776 JOINT DEVICE (IMPLANTABLE): HCPCS | Performed by: ORTHOPAEDIC SURGERY

## 2023-02-23 PROCEDURE — 94640 AIRWAY INHALATION TREATMENT: CPT

## 2023-02-23 PROCEDURE — 71000033 HC RECOVERY, INTIAL HOUR: Performed by: ORTHOPAEDIC SURGERY

## 2023-02-23 PROCEDURE — 97530 THERAPEUTIC ACTIVITIES: CPT

## 2023-02-23 PROCEDURE — 36000711: Performed by: ORTHOPAEDIC SURGERY

## 2023-02-23 PROCEDURE — 97162 PT EVAL MOD COMPLEX 30 MIN: CPT

## 2023-02-23 PROCEDURE — C1713 ANCHOR/SCREW BN/BN,TIS/BN: HCPCS | Performed by: ORTHOPAEDIC SURGERY

## 2023-02-23 PROCEDURE — 25000003 PHARM REV CODE 250: Performed by: NURSE PRACTITIONER

## 2023-02-23 PROCEDURE — 25000003 PHARM REV CODE 250: Performed by: NURSE ANESTHETIST, CERTIFIED REGISTERED

## 2023-02-23 PROCEDURE — 63600175 PHARM REV CODE 636 W HCPCS: Performed by: NURSE PRACTITIONER

## 2023-02-23 PROCEDURE — 37000008 HC ANESTHESIA 1ST 15 MINUTES: Performed by: ORTHOPAEDIC SURGERY

## 2023-02-23 PROCEDURE — 97110 THERAPEUTIC EXERCISES: CPT

## 2023-02-23 PROCEDURE — 63600175 PHARM REV CODE 636 W HCPCS: Performed by: ORTHOPAEDIC SURGERY

## 2023-02-23 PROCEDURE — 27201423 OPTIME MED/SURG SUP & DEVICES STERILE SUPPLY: Performed by: ORTHOPAEDIC SURGERY

## 2023-02-23 PROCEDURE — C9290 INJ, BUPIVACAINE LIPOSOME: HCPCS | Performed by: ORTHOPAEDIC SURGERY

## 2023-02-23 PROCEDURE — 37000009 HC ANESTHESIA EA ADD 15 MINS: Performed by: ORTHOPAEDIC SURGERY

## 2023-02-23 PROCEDURE — 94761 N-INVAS EAR/PLS OXIMETRY MLT: CPT

## 2023-02-23 PROCEDURE — 36000710: Performed by: ORTHOPAEDIC SURGERY

## 2023-02-23 PROCEDURE — 82962 GLUCOSE BLOOD TEST: CPT | Performed by: ORTHOPAEDIC SURGERY

## 2023-02-23 PROCEDURE — 76942 ECHO GUIDE FOR BIOPSY: CPT | Performed by: ANESTHESIOLOGY

## 2023-02-23 PROCEDURE — C9290 INJ, BUPIVACAINE LIPOSOME: HCPCS | Performed by: ANESTHESIOLOGY

## 2023-02-23 PROCEDURE — 71000039 HC RECOVERY, EACH ADD'L HOUR: Performed by: ORTHOPAEDIC SURGERY

## 2023-02-23 DEVICE — PATELLA COMPONENT3PEG 37X10MM: Type: IMPLANTABLE DEVICE | Site: KNEE | Status: FUNCTIONAL

## 2023-02-23 DEVICE — IMPLANTABLE DEVICE: Type: IMPLANTABLE DEVICE | Site: KNEE | Status: FUNCTIONAL

## 2023-02-23 DEVICE — BEARING ARCOM TIB 10X79MM: Type: IMPLANTABLE DEVICE | Site: KNEE | Status: FUNCTIONAL

## 2023-02-23 DEVICE — TIBIAL TRAY CRUCIATE 79MM: Type: IMPLANTABLE DEVICE | Site: KNEE | Status: FUNCTIONAL

## 2023-02-23 RX ORDER — PHENYLEPHRINE HYDROCHLORIDE 10 MG/ML
INJECTION INTRAVENOUS
Status: DISCONTINUED | OUTPATIENT
Start: 2023-02-23 | End: 2023-02-23

## 2023-02-23 RX ORDER — TRANEXAMIC ACID 100 MG/ML
INJECTION, SOLUTION INTRAVENOUS
Status: DISCONTINUED | OUTPATIENT
Start: 2023-02-23 | End: 2023-02-23 | Stop reason: HOSPADM

## 2023-02-23 RX ORDER — MUPIROCIN 20 MG/G
1 OINTMENT TOPICAL 2 TIMES DAILY
Status: DISCONTINUED | OUTPATIENT
Start: 2023-02-23 | End: 2023-02-24 | Stop reason: HOSPADM

## 2023-02-23 RX ORDER — CEFAZOLIN SODIUM 2 G/50ML
2 SOLUTION INTRAVENOUS
Status: COMPLETED | OUTPATIENT
Start: 2023-02-23 | End: 2023-02-24

## 2023-02-23 RX ORDER — HYDROCODONE BITARTRATE AND ACETAMINOPHEN 10; 325 MG/1; MG/1
1 TABLET ORAL EVERY 4 HOURS PRN
Status: DISCONTINUED | OUTPATIENT
Start: 2023-02-23 | End: 2023-02-24 | Stop reason: HOSPADM

## 2023-02-23 RX ORDER — DEXTROSE 40 %
15 GEL (GRAM) ORAL
Status: DISCONTINUED | OUTPATIENT
Start: 2023-02-23 | End: 2023-02-24 | Stop reason: HOSPADM

## 2023-02-23 RX ORDER — BUPIVACAINE HYDROCHLORIDE AND EPINEPHRINE 2.5; 5 MG/ML; UG/ML
INJECTION, SOLUTION EPIDURAL; INFILTRATION; INTRACAUDAL; PERINEURAL
Status: DISCONTINUED | OUTPATIENT
Start: 2023-02-23 | End: 2023-02-23 | Stop reason: HOSPADM

## 2023-02-23 RX ORDER — SODIUM CHLORIDE 0.9 % (FLUSH) 0.9 %
5 SYRINGE (ML) INJECTION
Status: DISCONTINUED | OUTPATIENT
Start: 2023-02-23 | End: 2023-02-24 | Stop reason: HOSPADM

## 2023-02-23 RX ORDER — BUPIVACAINE HYDROCHLORIDE 5 MG/ML
INJECTION, SOLUTION EPIDURAL; INTRACAUDAL
Status: COMPLETED | OUTPATIENT
Start: 2023-02-23 | End: 2023-02-23

## 2023-02-23 RX ORDER — METOCLOPRAMIDE HYDROCHLORIDE 5 MG/ML
5 INJECTION INTRAMUSCULAR; INTRAVENOUS EVERY 6 HOURS PRN
Status: DISCONTINUED | OUTPATIENT
Start: 2023-02-23 | End: 2023-02-24 | Stop reason: HOSPADM

## 2023-02-23 RX ORDER — MELATONIN 1 MG/ML
8 LIQUID (ML) ORAL NIGHTLY PRN
Status: DISCONTINUED | OUTPATIENT
Start: 2023-02-23 | End: 2023-02-24 | Stop reason: HOSPADM

## 2023-02-23 RX ORDER — CEFAZOLIN SODIUM 2 G/50ML
2 SOLUTION INTRAVENOUS
Status: DISCONTINUED | OUTPATIENT
Start: 2023-02-23 | End: 2023-02-23

## 2023-02-23 RX ORDER — MIDAZOLAM HYDROCHLORIDE 1 MG/ML
INJECTION INTRAMUSCULAR; INTRAVENOUS
Status: DISCONTINUED | OUTPATIENT
Start: 2023-02-23 | End: 2023-02-23

## 2023-02-23 RX ORDER — PREGABALIN 75 MG/1
75 CAPSULE ORAL 2 TIMES DAILY PRN
Status: DISCONTINUED | OUTPATIENT
Start: 2023-02-23 | End: 2023-02-24 | Stop reason: HOSPADM

## 2023-02-23 RX ORDER — HYDROMORPHONE HYDROCHLORIDE 2 MG/ML
0.4 INJECTION, SOLUTION INTRAMUSCULAR; INTRAVENOUS; SUBCUTANEOUS EVERY 5 MIN PRN
Status: DISCONTINUED | OUTPATIENT
Start: 2023-02-23 | End: 2023-02-23 | Stop reason: HOSPADM

## 2023-02-23 RX ORDER — SOTALOL HYDROCHLORIDE 80 MG/1
120 TABLET ORAL EVERY 12 HOURS
Status: DISCONTINUED | OUTPATIENT
Start: 2023-02-23 | End: 2023-02-24 | Stop reason: HOSPADM

## 2023-02-23 RX ORDER — FENTANYL CITRATE 50 UG/ML
INJECTION, SOLUTION INTRAMUSCULAR; INTRAVENOUS
Status: DISCONTINUED | OUTPATIENT
Start: 2023-02-23 | End: 2023-02-23

## 2023-02-23 RX ORDER — DEXTROSE MONOHYDRATE AND SODIUM CHLORIDE 5; .9 G/100ML; G/100ML
INJECTION, SOLUTION INTRAVENOUS CONTINUOUS
Status: DISCONTINUED | OUTPATIENT
Start: 2023-02-23 | End: 2023-02-23

## 2023-02-23 RX ORDER — ASPIRIN 81 MG/1
81 TABLET ORAL DAILY
Status: DISCONTINUED | OUTPATIENT
Start: 2023-02-23 | End: 2023-02-24 | Stop reason: HOSPADM

## 2023-02-23 RX ORDER — SODIUM CHLORIDE 0.9 % (FLUSH) 0.9 %
3 SYRINGE (ML) INJECTION
Status: DISCONTINUED | OUTPATIENT
Start: 2023-02-23 | End: 2023-02-23 | Stop reason: HOSPADM

## 2023-02-23 RX ORDER — LIDOCAINE HYDROCHLORIDE 10 MG/ML
1 INJECTION, SOLUTION EPIDURAL; INFILTRATION; INTRACAUDAL; PERINEURAL ONCE
Status: DISCONTINUED | OUTPATIENT
Start: 2023-02-23 | End: 2023-02-23 | Stop reason: HOSPADM

## 2023-02-23 RX ORDER — ACETAMINOPHEN 500 MG
1000 TABLET ORAL
Status: COMPLETED | OUTPATIENT
Start: 2023-02-23 | End: 2023-02-23

## 2023-02-23 RX ORDER — PROPOFOL 10 MG/ML
VIAL (ML) INTRAVENOUS CONTINUOUS PRN
Status: DISCONTINUED | OUTPATIENT
Start: 2023-02-23 | End: 2023-02-23

## 2023-02-23 RX ORDER — MEPERIDINE HYDROCHLORIDE 25 MG/ML
12.5 INJECTION INTRAMUSCULAR; INTRAVENOUS; SUBCUTANEOUS ONCE AS NEEDED
Status: DISCONTINUED | OUTPATIENT
Start: 2023-02-23 | End: 2023-02-23 | Stop reason: HOSPADM

## 2023-02-23 RX ORDER — LIDOCAINE HCL/PF 100 MG/5ML
SYRINGE (ML) INTRAVENOUS
Status: DISCONTINUED | OUTPATIENT
Start: 2023-02-23 | End: 2023-02-23

## 2023-02-23 RX ORDER — EPHEDRINE SULFATE 50 MG/ML
INJECTION, SOLUTION INTRAVENOUS
Status: DISCONTINUED | OUTPATIENT
Start: 2023-02-23 | End: 2023-02-23

## 2023-02-23 RX ORDER — HYDROCODONE BITARTRATE AND ACETAMINOPHEN 5; 325 MG/1; MG/1
1 TABLET ORAL EVERY 4 HOURS PRN
Status: DISCONTINUED | OUTPATIENT
Start: 2023-02-23 | End: 2023-02-24 | Stop reason: HOSPADM

## 2023-02-23 RX ORDER — ONDANSETRON 8 MG/1
8 TABLET, ORALLY DISINTEGRATING ORAL EVERY 8 HOURS PRN
Status: DISCONTINUED | OUTPATIENT
Start: 2023-02-23 | End: 2023-02-24 | Stop reason: HOSPADM

## 2023-02-23 RX ORDER — ALBUTEROL SULFATE 2.5 MG/.5ML
2.5 SOLUTION RESPIRATORY (INHALATION)
Status: COMPLETED | OUTPATIENT
Start: 2023-02-23 | End: 2023-02-23

## 2023-02-23 RX ORDER — ATORVASTATIN CALCIUM 20 MG/1
80 TABLET, FILM COATED ORAL NIGHTLY
Status: DISCONTINUED | OUTPATIENT
Start: 2023-02-23 | End: 2023-02-24 | Stop reason: HOSPADM

## 2023-02-23 RX ORDER — OXYCODONE HYDROCHLORIDE 5 MG/1
5 TABLET ORAL
Status: DISCONTINUED | OUTPATIENT
Start: 2023-02-23 | End: 2023-02-23 | Stop reason: HOSPADM

## 2023-02-23 RX ORDER — SODIUM CHLORIDE 9 MG/ML
INJECTION, SOLUTION INTRAVENOUS CONTINUOUS
Status: DISCONTINUED | OUTPATIENT
Start: 2023-02-23 | End: 2023-02-24 | Stop reason: HOSPADM

## 2023-02-23 RX ORDER — SODIUM CHLORIDE 9 MG/ML
INJECTION, SOLUTION INTRAVENOUS CONTINUOUS
Status: DISCONTINUED | OUTPATIENT
Start: 2023-02-23 | End: 2023-02-23

## 2023-02-23 RX ORDER — CEFAZOLIN SODIUM 1 G/3ML
2 INJECTION, POWDER, FOR SOLUTION INTRAMUSCULAR; INTRAVENOUS
Status: COMPLETED | OUTPATIENT
Start: 2023-02-23 | End: 2023-02-23

## 2023-02-23 RX ORDER — INSULIN ASPART 100 [IU]/ML
0-5 INJECTION, SOLUTION INTRAVENOUS; SUBCUTANEOUS
Status: DISCONTINUED | OUTPATIENT
Start: 2023-02-23 | End: 2023-02-24 | Stop reason: HOSPADM

## 2023-02-23 RX ORDER — DEXTROSE 40 %
30 GEL (GRAM) ORAL
Status: DISCONTINUED | OUTPATIENT
Start: 2023-02-23 | End: 2023-02-24 | Stop reason: HOSPADM

## 2023-02-23 RX ORDER — GLUCAGON 1 MG
1 KIT INJECTION
Status: DISCONTINUED | OUTPATIENT
Start: 2023-02-23 | End: 2023-02-24 | Stop reason: HOSPADM

## 2023-02-23 RX ORDER — POLYETHYLENE GLYCOL 3350 17 G/17G
17 POWDER, FOR SOLUTION ORAL DAILY
Status: DISCONTINUED | OUTPATIENT
Start: 2023-02-23 | End: 2023-02-24 | Stop reason: HOSPADM

## 2023-02-23 RX ORDER — OXYCODONE HYDROCHLORIDE 5 MG/1
10 TABLET ORAL ONCE
Status: COMPLETED | OUTPATIENT
Start: 2023-02-23 | End: 2023-02-23

## 2023-02-23 RX ORDER — FAMOTIDINE 20 MG/1
20 TABLET, FILM COATED ORAL DAILY
Status: DISCONTINUED | OUTPATIENT
Start: 2023-02-23 | End: 2023-02-24 | Stop reason: HOSPADM

## 2023-02-23 RX ORDER — MORPHINE SULFATE 4 MG/ML
4 INJECTION, SOLUTION INTRAMUSCULAR; INTRAVENOUS
Status: DISCONTINUED | OUTPATIENT
Start: 2023-02-23 | End: 2023-02-24 | Stop reason: HOSPADM

## 2023-02-23 RX ORDER — ROPIVACAINE HYDROCHLORIDE 5 MG/ML
INJECTION, SOLUTION EPIDURAL; INFILTRATION; PERINEURAL
Status: COMPLETED | OUTPATIENT
Start: 2023-02-23 | End: 2023-02-23

## 2023-02-23 RX ORDER — PREGABALIN 75 MG/1
75 CAPSULE ORAL 2 TIMES DAILY PRN
Status: DISCONTINUED | OUTPATIENT
Start: 2023-02-23 | End: 2023-02-23

## 2023-02-23 RX ORDER — PROCHLORPERAZINE EDISYLATE 5 MG/ML
5 INJECTION INTRAMUSCULAR; INTRAVENOUS EVERY 30 MIN PRN
Status: DISCONTINUED | OUTPATIENT
Start: 2023-02-23 | End: 2023-02-23 | Stop reason: HOSPADM

## 2023-02-23 RX ADMIN — HYDROMORPHONE HYDROCHLORIDE 0.4 MG: 2 INJECTION, SOLUTION INTRAMUSCULAR; INTRAVENOUS; SUBCUTANEOUS at 12:02

## 2023-02-23 RX ADMIN — ASPIRIN 81 MG: 81 TABLET, COATED ORAL at 01:02

## 2023-02-23 RX ADMIN — PHENYLEPHRINE HYDROCHLORIDE 200 MCG: 10 INJECTION INTRAVENOUS at 08:02

## 2023-02-23 RX ADMIN — CEFAZOLIN SODIUM 2 G: 2 SOLUTION INTRAVENOUS at 05:02

## 2023-02-23 RX ADMIN — MIDAZOLAM HYDROCHLORIDE 1 MG: 1 INJECTION, SOLUTION INTRAMUSCULAR; INTRAVENOUS at 08:02

## 2023-02-23 RX ADMIN — HYDROCODONE BITARTRATE AND ACETAMINOPHEN 1 TABLET: 10; 325 TABLET ORAL at 05:02

## 2023-02-23 RX ADMIN — EPHEDRINE SULFATE 10 MG: 50 INJECTION INTRAVENOUS at 10:02

## 2023-02-23 RX ADMIN — INSULIN DETEMIR 40 UNITS: 100 INJECTION, SOLUTION SUBCUTANEOUS at 08:02

## 2023-02-23 RX ADMIN — PHENYLEPHRINE HYDROCHLORIDE 100 MCG: 10 INJECTION INTRAVENOUS at 08:02

## 2023-02-23 RX ADMIN — ROPIVACAINE HYDROCHLORIDE 3 ML: 5 INJECTION, SOLUTION EPIDURAL; INFILTRATION; PERINEURAL at 08:02

## 2023-02-23 RX ADMIN — INSULIN ASPART 1 UNITS: 100 INJECTION, SOLUTION INTRAVENOUS; SUBCUTANEOUS at 08:02

## 2023-02-23 RX ADMIN — POLYETHYLENE GLYCOL 3350 17 G: 17 POWDER, FOR SOLUTION ORAL at 01:02

## 2023-02-23 RX ADMIN — EPHEDRINE SULFATE 10 MG: 50 INJECTION INTRAVENOUS at 09:02

## 2023-02-23 RX ADMIN — FENTANYL CITRATE 50 MCG: 50 INJECTION, SOLUTION INTRAMUSCULAR; INTRAVENOUS at 08:02

## 2023-02-23 RX ADMIN — EPHEDRINE SULFATE 20 MG: 50 INJECTION INTRAVENOUS at 10:02

## 2023-02-23 RX ADMIN — ALBUTEROL SULFATE 2.5 MG: 2.5 SOLUTION RESPIRATORY (INHALATION) at 07:02

## 2023-02-23 RX ADMIN — FAMOTIDINE 20 MG: 20 TABLET ORAL at 01:02

## 2023-02-23 RX ADMIN — PHENYLEPHRINE HYDROCHLORIDE 50 MCG: 10 INJECTION INTRAVENOUS at 08:02

## 2023-02-23 RX ADMIN — APIXABAN 5 MG: 2.5 TABLET, FILM COATED ORAL at 08:02

## 2023-02-23 RX ADMIN — SODIUM CHLORIDE: 9 INJECTION, SOLUTION INTRAVENOUS at 01:02

## 2023-02-23 RX ADMIN — CEFAZOLIN 2 G: 330 INJECTION, POWDER, FOR SOLUTION INTRAMUSCULAR; INTRAVENOUS at 08:02

## 2023-02-23 RX ADMIN — PHENYLEPHRINE HYDROCHLORIDE 200 MCG: 10 INJECTION INTRAVENOUS at 09:02

## 2023-02-23 RX ADMIN — HYDROMORPHONE HYDROCHLORIDE 0.4 MG: 2 INJECTION, SOLUTION INTRAMUSCULAR; INTRAVENOUS; SUBCUTANEOUS at 01:02

## 2023-02-23 RX ADMIN — ATORVASTATIN CALCIUM 80 MG: 20 TABLET, FILM COATED ORAL at 08:02

## 2023-02-23 RX ADMIN — PROPOFOL 75 MCG/KG/MIN: 10 INJECTION, EMULSION INTRAVENOUS at 08:02

## 2023-02-23 RX ADMIN — SOTALOL HYDROCHLORIDE 120 MG: 80 TABLET ORAL at 09:02

## 2023-02-23 RX ADMIN — MUPIROCIN 1 G: 20 OINTMENT TOPICAL at 08:02

## 2023-02-23 RX ADMIN — GLYCOPYRROLATE 0.4 MG: 0.2 INJECTION, SOLUTION INTRAMUSCULAR; INTRAVITREAL at 08:02

## 2023-02-23 RX ADMIN — SODIUM CHLORIDE, SODIUM LACTATE, POTASSIUM CHLORIDE, AND CALCIUM CHLORIDE: .6; .31; .03; .02 INJECTION, SOLUTION INTRAVENOUS at 08:02

## 2023-02-23 RX ADMIN — BUPIVACAINE 10 ML: 13.3 INJECTION, SUSPENSION, LIPOSOMAL INFILTRATION at 08:02

## 2023-02-23 RX ADMIN — BUPIVACAINE HYDROCHLORIDE 10 ML: 5 INJECTION, SOLUTION EPIDURAL; INTRACAUDAL; PERINEURAL at 08:02

## 2023-02-23 RX ADMIN — HYDROCODONE BITARTRATE AND ACETAMINOPHEN 1 TABLET: 10; 325 TABLET ORAL at 10:02

## 2023-02-23 RX ADMIN — LIDOCAINE HYDROCHLORIDE 10 MG: 10 INJECTION, SOLUTION EPIDURAL; INFILTRATION; INTRACAUDAL; PERINEURAL at 07:02

## 2023-02-23 RX ADMIN — PREGABALIN 75 MG: 75 CAPSULE ORAL at 06:02

## 2023-02-23 RX ADMIN — OXYCODONE HYDROCHLORIDE 10 MG: 5 TABLET ORAL at 10:02

## 2023-02-23 RX ADMIN — ACETAMINOPHEN 1000 MG: 500 TABLET, FILM COATED ORAL at 07:02

## 2023-02-23 RX ADMIN — PHENYLEPHRINE HYDROCHLORIDE 150 MCG: 10 INJECTION INTRAVENOUS at 08:02

## 2023-02-23 RX ADMIN — HYDROCODONE BITARTRATE AND ACETAMINOPHEN 1 TABLET: 10; 325 TABLET ORAL at 01:02

## 2023-02-23 RX ADMIN — LIDOCAINE HYDROCHLORIDE 50 MG: 20 INJECTION, SOLUTION INTRAVENOUS at 08:02

## 2023-02-23 RX ADMIN — SODIUM CHLORIDE, SODIUM LACTATE, POTASSIUM CHLORIDE, AND CALCIUM CHLORIDE: .6; .31; .03; .02 INJECTION, SOLUTION INTRAVENOUS at 09:02

## 2023-02-23 RX ADMIN — INSULIN ASPART 2 UNITS: 100 INJECTION, SOLUTION INTRAVENOUS; SUBCUTANEOUS at 06:02

## 2023-02-23 NOTE — ANESTHESIA PROCEDURE NOTES
Peripheral Block    Patient location during procedure: holding area   Block not for primary anesthetic.  Reason for block: at surgeon's request and post-op pain management   Post-op Pain Location: DJD knee   Timeout: 2/23/2023 8:22 AM   End time: 2/23/2023 8:27 AM    Staffing  Authorizing Provider: Marcus Edward MD  Performing Provider: Marcus Edward MD    Preanesthetic Checklist  Completed: patient identified, IV checked, site marked, risks and benefits discussed, surgical consent, monitors and equipment checked, pre-op evaluation and timeout performed  Peripheral Block  Patient position: supine  Prep: ChloraPrep  Patient monitoring: heart rate, cardiac monitor, continuous pulse ox and frequent blood pressure checks  Block type: adductor canal  Laterality: right  Injection technique: single shot  Needle  Needle gauge: 21 G  Needle length: 4 in  Needle localization: ultrasound guidance   -ultrasound image captured on disc.  Assessment  Injection assessment: negative parasthesia, negative aspiration and local visualized surrounding nerve  Paresthesia pain: none  Heart rate change: no  Slow fractionated injection: yes  Pain Tolerance: comfortable throughout block and no complaints  Medications:    Medications: bupivacaine (pf) (MARCAINE) injection 0.5% - Perineural   10 mL - 2/23/2023 8:27:00 AM

## 2023-02-23 NOTE — PLAN OF CARE
"  Problem: Physical Therapy  Goal: Physical Therapy Goal  Description: Goals to be met by: 3/2/2023    Patient will increase functional independence with mobility by performin. Sit<>stand with supervision with rolling walker.  2. Gait x 150 feet with supervision with rolling walker.  3. Ascend/descend 4" curb step(s) with rolling walker and supervision.       Outcome: Ongoing, Progressing     Patient evaluated by PT and goals established. Patient with moderate pain during therapy session. AAROM knee flexion ROM 10-70. Bed mobility with contact guard assistance, sit<>stand with contact guard assistance with RW, and amb x150ft with RW and contact guard assistance.  PT will continue to follow and progress as tolerated. Rec for d/c to home with  PT/OT. Please see progress note for detailed plan of care and recommendations.   "

## 2023-02-23 NOTE — OP NOTE
[facility]  Surgery Department  Operative Note    SUMMARY     Date of Procedure: 2/23/2023     Procedure: Procedure(s) (LRB):  ARTHROPLASTY, KNEE, TOTAL (Right)     Surgeon(s) and Role:     * Joe Holguin MD - Primary    Assisting Surgeon: None    Pre-Operative Diagnosis: RIGHT KNEE/gutierrez Hines    Post-Operative Diagnosis: Post-Op Diagnosis Codes:     * Unilateral primary osteoarthritis, left knee [M17.12]     * Osteoarthritis of right knee [M17.11]    Anesthesia: Choice    Operative Findings (including complications, if any):  Osteoarthritis right knee    Description of Technical Procedures:  Patient brought the operating room and a spinal anesthesia and adductor block placed carefully on the operating table right lower extremity positioned prepped in usual fashion tourniquet applied 250 mm mercury after Esmarch.  Using standard midline incision sharp dissection made down extensor mechanism standard medial parapatellar arthrotomy was performed medial fat pad was removed and medial release performed on to gain access to proximal tibia.  His overall alignment was good.  Anterior posterior cruciate ligaments removed mediolateral meniscal remnants removed.  A tibial cut was made per midshaft tibia sizing was 79 attention turned to the femur with intramedullary guide a 3 degree provisional distal cut was performed.  Upon doing this he had extension gap at 10 excellent alignment and stability.  Sizing on the femur was 70 so with the tensor in flexion anterior-posterior cuts performed.  Flexion gap was symmetric at 10 extension gaps symmetric at 10.  So then the chamber cuts performed.  With a floating trial 0-130 degrees range of motion rotation was marked tibia was punched prepatellar thickness at the now side was 13.  Even it out put a 37 patella.  Final was 23.  The appropriate components opened using good cement technique key all components were cemented excess cement was removed pulse lavage  irrigation was used.  Final 10 mm AS insert placed.  Again excellent range of motion stability.  1. Vicryl using parapatellar arthrotomy closed in flexion post closure 0-130 degrees range of motion to 0 Vicryl subcutaneously staples on the skin.  Exparel Marcaine and TXA were instilled the soft tissues he is placed in bulky sterile dressing tourniquet released 57 minutes tolerated procedure well brought to come sterile fashion     This performed with a poor recognition system.    Significant Surgical Tasks Conducted by the Assistant(s), if Applicable:  Retraction    Estimated Blood Loss (EBL): * No values recorded between 2/23/2023  9:05 AM and 2/23/2023 10:09 AM * 57           Implants:   Implant Name Type Inv. Item Serial No.  Lot No. LRB No. Used Action   Comprehend Systems R+G pro 80    Peixe Urbano [id number] Right 1 Implanted   PATELLA [id number] - [id number]  PATELLA [id number]  [employer] [id number] Right 1 Implanted   TIBIAL TRAY CRUCIATE 79MM - [id number]  TIBIAL TRAY CRUCIATE 79MM  [employer] [id number] Right 1 Implanted   BEARING ARCOM TIB [id number] - [id number]  BEARING ARCOM TIB [id number]  RENÉE,INC [id number] Right 1 Implanted   vanguard CR femoral 70mm     RENÉE [employer] [id number] Right 1 Implanted       Specimens:   Specimen (24h ago, onward)      None                    Condition: Good    Disposition: PACU - hemodynamically stable.    Attestation: I was present and scrubbed for the entire procedure.

## 2023-02-23 NOTE — ANESTHESIA POSTPROCEDURE EVALUATION
Anesthesia Post Evaluation    Patient: Anastacio Michaels    Procedure(s) Performed: Procedure(s) (LRB):  ARTHROPLASTY, KNEE, TOTAL (Right)    Final Anesthesia Type: spinal      Patient location during evaluation: PACU  Patient participation: Yes- Able to Participate  Level of consciousness: awake and alert  Post-procedure vital signs: reviewed and stable  Pain management: adequate  Airway patency: patent    PONV status at discharge: No PONV  Anesthetic complications: no      Cardiovascular status: blood pressure returned to baseline  Respiratory status: unassisted, spontaneous ventilation and room air  Hydration status: euvolemic  Follow-up not needed.          Vitals Value Taken Time   /72 02/23/23 1126   Temp 36 02/23/23 1141   Pulse 71 02/23/23 1140   Resp 16 02/23/23 1125   SpO2 96 % 02/23/23 1140   Vitals shown include unvalidated device data.      No case tracking events are documented in the log.      Pain/Kimi Score: Pain Rating Prior to Med Admin: 4 (2/23/2023 10:56 AM)  Kimi Score: 9 (2/23/2023 11:08 AM)

## 2023-02-23 NOTE — ANESTHESIA PROCEDURE NOTES
Spinal    Diagnosis: Osteo knee  Patient location during procedure: holding area  Start time: 2/23/2023 8:28 AM  Timeout: 2/23/2023 8:28 AM  End time: 2/23/2023 8:33 AM    Staffing  Authorizing Provider: Leon Kiser MD  Performing Provider: Leon Kiser MD    Preanesthetic Checklist  Completed: patient identified, IV checked, site marked, risks and benefits discussed, surgical consent, monitors and equipment checked, pre-op evaluation and timeout performed  Spinal Block  Patient position: sitting  Prep: ChloraPrep  Patient monitoring: heart rate, cardiac monitor, continuous pulse ox and frequent blood pressure checks  Approach: midline  Location: L3-4  Injection technique: single shot  CSF Fluid: clear free-flowing CSF  Needle  Needle type: pencil-tip   Needle gauge: 22 G  Needle length: 3.5 in  Additional Documentation: incremental injection, negative aspiration for heme and no paresthesia on injection  Needle localization: anatomical landmarks  Assessment  Sensory level: T5   Dermatomal levels determined by alcohol wipe  Ease of block: easy  Patient's tolerance of the procedure: comfortable throughout block  Medications:    Medications: ropivacaine (NAROPIN) injection 0.5% - Intraspinal   3 mL - 2/23/2023 8:32:00 AM

## 2023-02-23 NOTE — TRANSFER OF CARE
Anesthesia Transfer of Care Note    Patient: Anastacio Michaels    Procedure(s) Performed: Procedure(s) (LRB):  ARTHROPLASTY, KNEE, TOTAL (Right)    Patient location: PACU    Anesthesia Type: spinal    Transport from OR: Transported from OR on room air with adequate spontaneous ventilation    Post pain: adequate analgesia    Post assessment: no apparent anesthetic complications    Post vital signs: stable (B/P decreased, treated with Ephedrine as charted.)    Level of consciousness: awake, alert and oriented    Nausea/Vomiting: no nausea/vomiting    Complications: none    Transfer of care protocol was followed      Last vitals:   Visit Vitals  BP (!) 111/59 (BP Location: Right arm, Patient Position: Lying)   Pulse 67   Temp 36.3 °C (97.4 °F) (Temporal)   Resp 16   SpO2 98%

## 2023-02-23 NOTE — CARE UPDATE
02/23/23 0749   Patient Assessment/Suction   Level of Consciousness (AVPU) alert   Respiratory Effort Normal;Unlabored   Expansion/Accessory Muscles/Retractions no retractions;no use of accessory muscles   All Lung Fields Breath Sounds clear;equal bilaterally   ADELINA Breath Sounds clear   LLL Breath Sounds diminished   RUL Breath Sounds clear   RML Breath Sounds clear   RLL Breath Sounds diminished   Rhythm/Pattern, Respiratory no shortness of breath reported   Cough Frequency infrequent   Cough Type dry;fair   PRE-TX-O2   Device (Oxygen Therapy) room air   SpO2 100 %   Pulse Oximetry Type Intermittent   $ Pulse Oximetry - Multiple Charge Pulse Oximetry - Multiple   Pulse 60   Resp 16   Aerosol Therapy   $ Aerosol Therapy Charges Aerosol Treatment   Daily Review of Necessity (SVN) completed   Respiratory Treatment Status (SVN) given   Treatment Route (SVN) mouthpiece;oxygen   Patient Position (SVN) Borjas's   Post Treatment Assessment (SVN) breath sounds unchanged   Signs of Intolerance (SVN) none

## 2023-02-23 NOTE — PT/OT/SLP EVAL
Physical Therapy Evaluation    Patient Name:  Anastacio Michaels   MRN:  [id number]    Recommendations:     Discharge Recommendations: home health PT   Discharge Equipment Recommendations: walker, rolling   Barriers to discharge: None    Assessment:     Anastacio Michaels is a 72 y.o. male admitted with a medical diagnosis of Osteoarthritis of right knee.  He presents with the following impairments/functional limitations: weakness, impaired endurance, impaired self care skills, impaired functional mobility, gait instability, impaired balance, decreased lower extremity function, decreased safety awareness, pain, impaired coordination, decreased ROM, edema, impaired skin, orthopedic precautions.    Patient evaluated by PT and goals established. Patient with moderate pain during therapy session. AAROM knee flexion ROM 10-70. Bed mobility with contact guard assistance, sit<>stand with contact guard assistance with RW, and amb x150ft with RW and contact guard assistance.  PT will continue to follow and progress as tolerated. Rec for d/c to home with  PT/OT.    Rehab Prognosis: Good; patient would benefit from acute skilled PT services to address these deficits and reach maximum level of function.    Recent Surgery: Procedure(s) (LRB):  ARTHROPLASTY, KNEE, TOTAL (Right) Day of Surgery    Plan:     During this hospitalization, patient to be seen daily to address the identified rehab impairments via gait training, therapeutic activities, therapeutic exercises, neuromuscular re-education and progress toward the following goals:    Plan of Care Expires:  03/02/23    Subjective     Chief Complaint: R TKA pain  Patient/Family Comments/goals: Patient wants to be able to walk without pain. Patient agreeable to PT evaluation.  Pain/Comfort:  Pain Rating 1:  (moderate R TKA pain)  Location - Side 1: Right  Location 1: knee  Pain Addressed 1: Reposition, Distraction  Pain Rating Post-Intervention 1:  (moderate R TKA  pain)    Patients cultural, spiritual, Buddhism conflicts given the current situation: no    Living Environment:  Patient [facility] his [facility]    Prior to admission, patients level of function was independent.  Equipment used at home:  (patient has RW, quad cane, crutches, bedside commode and shower chair - from his wife's prior surgery).  DME owned (not currently used): rolling walker, bedside commode, shower chair, and quad cane .  Upon discharge, patient will have assistance from his spouse.    Objective:     Communicated with nurse prior to session.  Patient found HOB elevated with peripheral IV (two ice packs on R knee)  upon PT entry to room.    General Precautions: Standard, fall  Orthopedic Precautions:RLE weight bearing as tolerated   Braces: N/A  Respiratory Status: Room air    Exams:  Cognition:   Patient is oriented to name, , date, place, situation.  Pt follows approximately 100% of one step commands.    Mood: Cooperative yet frustrated with current condition.   Musculoskeletal:  Posture: Protective guarding surgical joint  LE ROM/Strength: 5/5 bilateral hip flexion, nonsurgical knee extension, bilateral ankle dorsiflexion. AROM surgical knee difficult to accurately assess with large bulky dressing, although knee flexion grossly 10-70 degrees. Surgical knee strength grossly 5/5 knee flexion and 3-/5 knee extension.  Neuromuscular:  Sensation: Intact to light touch bilateral LEs. Pt denied paresthesias.   Coordination/Tone/Reflexes: No impairments identified with functional mobility. No formal testing performed.   Balance: CGA for dynamic standing with bilateral UE support.   Visual-vestibular: No impairments identified with functional mobility. No formal testing performed.  Integument:  Visible skin intact and surgical extremity dressing clean and dry.   Cardiopulmonary:  Edema: Mild surgical extremity.      Functional Mobility:  Bed Mobility:     Rolling Left:  contact guard  "assistance  Scooting: contact guard assistance  Supine to Sit: contact guard assistance  Transfers:     Sit to Stand:  contact guard assistance with rolling walker  Gait:   Ambulated 150ft with contact guard assistance with rolling walker  Demonstrated step-through gait pattern with reduced gait speed and michelle  Patient had forward head and trunk initially due to using wife's rolling walker (mariusz walker); improved posture and gait step length with new rolling walker with correct height      AM-PAC 6 CLICK MOBILITY  Total Score:18       Treatment & Education:  Perform therapeutic exercises x10 to increase strength and mobility (ankle pumps, quad sets, glute sets, SAQs, SLRs, ABD/ADD, and heel slides)  Perform sit <> stand transfers   Verbal cues on hand placement upon standing and sitting  Perform ambulation with rolling walker  Verbal cues on hand and foot placement during gait  PT educated patient and spouse re:   PT plan of care/role of PT  Safety with OOB mobility  Use of RW  Positioning of LE and use of ice  Orthopedic precautions  Gait deviations  Therapeutic exercises  Discharge disposition    Pt and spouse verbalized understanding      Patient left up in chair with all lines intact, call button in reach, nurse notified, and spouse present.    GOALS:   Multidisciplinary Problems       Physical Therapy Goals          Problem: Physical Therapy    Goal Priority Disciplines Outcome Goal Variances Interventions   Physical Therapy Goal     PT, PT/OT Ongoing, Progressing     Description: Goals to be met by: 3/2/2023    Patient will increase functional independence with mobility by performin. Sit<>stand with supervision with rolling walker.  2. Gait x 150 feet with supervision with rolling walker.  3. Ascend/descend 4" curb step(s) with rolling walker and supervision.                            History:     Past Medical History:   Diagnosis Date    Anticoagulant long-term use     Arthritis     Atrial " fibrillation     Coronary artery disease     Diabetes mellitus     Hypertension     PONV (postoperative nausea and vomiting)        Past Surgical History:   Procedure Laterality Date    ANKLE SURGERY Left 1986    ARTHROPLASTY OF SHOULDER Left     CARDIOVERSION      x 2    COLONOSCOPY      several    CORONARY ANGIOPLASTY WITH STENT PLACEMENT      x2    HERNIA REPAIR      KNEE ARTHROSCOPY Bilateral     REVERSE TOTAL SHOULDER ARTHROPLASTY Right 03/22/2022    Procedure: ARTHROPLASTY, SHOULDER, TOTAL, REVERSE - RIGHT;  Surgeon: Claude S. Williams IV, MD;  Location: [facility];  Service: Orthopedics;  Laterality: Right;    ROTATOR CUFF REPAIR Right        Time Tracking:     PT Received On: 02/23/23  PT Start Time: 1425     PT Stop Time: 1500  PT Total Time (min): 35 min     Billable Minutes: Evaluation 10, Therapeutic Activity 15, and Therapeutic Exercise 10      02/23/2023

## 2023-02-23 NOTE — CONSULTS
Consult Note  MED    Consult Requested By: Joe Holguin MD  Reason for Consult: AF/HTN/DM/CAD    SUBJECTIVE:     History of Present Illness:  Patient is a 72 y.o. male presents with scheduled right knee repair.  Seen post op in PACU.  Had hypotensive episide from spinal and received epi pushes and fluid boluses.  Responded appropriately and doing well so far.  Still under effects of spinal anesthesia.  Pre-op/Epic reviewed.  No CP/SOB/N/V/D/F/C.      Past Medical History:   Diagnosis Date    Anticoagulant long-term use     Arthritis     Atrial fibrillation     Coronary artery disease     Diabetes mellitus     Hypertension     PONV (postoperative nausea and vomiting)      Past Surgical History:   Procedure Laterality Date    ANKLE SURGERY Left 1986    ARTHROPLASTY OF SHOULDER Left     CARDIOVERSION      x 2    COLONOSCOPY      several    CORONARY ANGIOPLASTY WITH STENT PLACEMENT      x2    HERNIA REPAIR      KNEE ARTHROSCOPY Bilateral     REVERSE TOTAL SHOULDER ARTHROPLASTY Right 03/22/2022    Procedure: ARTHROPLASTY, SHOULDER, TOTAL, REVERSE - RIGHT;  Surgeon: Claude S. Williams IV, MD;  Location: [facility];  Service: Orthopedics;  Laterality: Right;    ROTATOR CUFF REPAIR Right      History reviewed. No pertinent family history.  Social History     Tobacco Use    Smoking status: Every Day     Packs/day: 1.00     Types: Cigarettes    Smokeless tobacco: Never   Substance Use Topics    Alcohol use: Yes     Comment: wine 1-2 per night    Drug use: Never       Review of patient's allergies indicates:  No Known Allergies     Review of Systems:  Constitutional: No fever or chills  Respiratory: No cough or shortness of breath  Cardiovascular: No chest pain or palpitations  Gastrointestinal: No nausea or vomiting  Neurological: No confusion or weakness    OBJECTIVE:     Vital Signs (Most Recent)  Temp: 97.4 °F (36.3 °C) (02/23/23 1008)  Pulse: 67 (02/23/23 1008)  Resp: 16 (02/23/23 1008)  BP: (!) 111/59 (02/23/23  1008)  SpO2: 98 % (02/23/23 1008)    Vital Signs Range (Last 24H):  Temp:  [97.4 °F (36.3 °C)-98 °F (36.7 °C)]   Pulse:  [60-70]   Resp:  [16-18]   BP: (111-127)/(59-71)   SpO2:  [97 %-100 %]       Intake/Output Summary (Last 24 hours) at 2/23/2023 1029  Last data filed at 2/23/2023 1015  Gross per 24 hour   Intake 1300 ml   Output --   Net 1300 ml       Physical Exam:  General appearance: Well developed, well nourished  Eyes:  Conjunctivae/corneas clear. PERRL.  Lungs: Normal respiratory effort,   clear to auscultation bilaterally   Heart: AF rate and rhythm, S1, S2 normal, no murmur, rub or kaylene.  Abdomen: Soft, non-tender non-distended; bowel sounds normal; no masses,  no organomegaly  Extremities: No cyanosis or clubbing. No edema.    Skin: Skin color, texture, turgor normal. No rashes or lesions  Neurologic: Normal strength and tone. No focal numbness or weakness   Right Knee CDI    Laboratory:  No results for input(s): WBC, RBC, HGB, HCT, PLT, MCV, MCH, MCHC in the last 24 hours.  BMP: No results for input(s): GLU, NA, K, CL, CO2, BUN, CREATININE, CALCIUM, MG in the last 168 hours.    Invalid input(s):  PHOS  Lab Results   Component Value Date    CALCIUM 9.5 03/17/2022    PHOS 3.7 08/13/2012     BNP  No results for input(s): BNP, BNPTRIAGEBLO in the last 168 hours.No results found for: URICACIDNo results found for: IRON, TIBC, FERRITIN, SATURATEDIRO  Lab Results   Component Value Date    CALCIUM 9.5 03/17/2022    PHOS 3.7 08/13/2012       Diagnostic Results:  X-Ray Knee 1 or 2 View Right    (Results Pending)       ASSESSMENT/PLAN:     Right Knee:  per ortho/therapy teams.    CAD:  placed on tele.  Cleared by Dr. Nikki JIMENEZ.      DM:  substitute for lantus with levemir.  ADA/SSI.  Resume jardiance at DC.  Hold metformin till am.      Afib:  sotolol.  Resume eliquis per ortho.    Lipids:  statin.    HTN with current hypotension:  due to spinal.  S/p epi.  Hold BP meds today and saline infusion.  Monitor BP  closely.      DVT:  eliquis per ortho.      Thanks for consult  See above  Will follow along.

## 2023-02-24 VITALS
HEART RATE: 86 BPM | OXYGEN SATURATION: 95 % | DIASTOLIC BLOOD PRESSURE: 74 MMHG | TEMPERATURE: 97 F | WEIGHT: 231 LBS | HEIGHT: 70 IN | BODY MASS INDEX: 33.07 KG/M2 | SYSTOLIC BLOOD PRESSURE: 149 MMHG | RESPIRATION RATE: 16 BRPM

## 2023-02-24 LAB
ANION GAP SERPL CALC-SCNC: 8 MMOL/L (ref 8–16)
BASOPHILS # BLD AUTO: 0.03 K/UL (ref 0–0.2)
BASOPHILS NFR BLD: 0.5 % (ref 0–1.9)
BUN SERPL-MCNC: 20 MG/DL (ref 8–23)
CALCIUM SERPL-MCNC: 8.7 MG/DL (ref 8.7–10.5)
CHLORIDE SERPL-SCNC: 106 MMOL/L (ref 95–110)
CO2 SERPL-SCNC: 22 MMOL/L (ref 23–29)
CREAT SERPL-MCNC: 1 MG/DL (ref 0.5–1.4)
DIFFERENTIAL METHOD: ABNORMAL
EOSINOPHIL # BLD AUTO: 0 K/UL (ref 0–0.5)
EOSINOPHIL NFR BLD: 0.7 % (ref 0–8)
ERYTHROCYTE [DISTWIDTH] IN BLOOD BY AUTOMATED COUNT: 13.2 % (ref 11.5–14.5)
EST. GFR  (NO RACE VARIABLE): >60 ML/MIN/1.73 M^2
GLUCOSE SERPL-MCNC: 197 MG/DL (ref 70–110)
HCT VFR BLD AUTO: 41.6 % (ref 40–54)
HGB BLD-MCNC: 13.9 G/DL (ref 14–18)
IMM GRANULOCYTES # BLD AUTO: 0.02 K/UL (ref 0–0.04)
IMM GRANULOCYTES NFR BLD AUTO: 0.4 % (ref 0–0.5)
LYMPHOCYTES # BLD AUTO: 1.1 K/UL (ref 1–4.8)
LYMPHOCYTES NFR BLD: 19.9 % (ref 18–48)
MCH RBC QN AUTO: 33.7 PG (ref 27–31)
MCHC RBC AUTO-ENTMCNC: 33.4 G/DL (ref 32–36)
MCV RBC AUTO: 101 FL (ref 82–98)
MONOCYTES # BLD AUTO: 1.1 K/UL (ref 0.3–1)
MONOCYTES NFR BLD: 18.6 % (ref 4–15)
NEUTROPHILS # BLD AUTO: 3.4 K/UL (ref 1.8–7.7)
NEUTROPHILS NFR BLD: 59.9 % (ref 38–73)
NRBC BLD-RTO: 0 /100 WBC
PLATELET # BLD AUTO: 131 K/UL (ref 150–450)
PMV BLD AUTO: 10.4 FL (ref 9.2–12.9)
POCT GLUCOSE: 181 MG/DL (ref 70–110)
POTASSIUM SERPL-SCNC: 4.3 MMOL/L (ref 3.5–5.1)
RBC # BLD AUTO: 4.12 M/UL (ref 4.6–6.2)
SODIUM SERPL-SCNC: 136 MMOL/L (ref 136–145)
WBC # BLD AUTO: 5.69 K/UL (ref 3.9–12.7)

## 2023-02-24 PROCEDURE — 85025 COMPLETE CBC W/AUTO DIFF WBC: CPT | Performed by: ORTHOPAEDIC SURGERY

## 2023-02-24 PROCEDURE — 63600175 PHARM REV CODE 636 W HCPCS: Performed by: ORTHOPAEDIC SURGERY

## 2023-02-24 PROCEDURE — 36415 COLL VENOUS BLD VENIPUNCTURE: CPT | Performed by: ORTHOPAEDIC SURGERY

## 2023-02-24 PROCEDURE — 97530 THERAPEUTIC ACTIVITIES: CPT

## 2023-02-24 PROCEDURE — 97166 OT EVAL MOD COMPLEX 45 MIN: CPT

## 2023-02-24 PROCEDURE — 25000003 PHARM REV CODE 250: Performed by: ORTHOPAEDIC SURGERY

## 2023-02-24 PROCEDURE — 80048 BASIC METABOLIC PNL TOTAL CA: CPT | Performed by: ORTHOPAEDIC SURGERY

## 2023-02-24 PROCEDURE — 97535 SELF CARE MNGMENT TRAINING: CPT

## 2023-02-24 PROCEDURE — 97116 GAIT TRAINING THERAPY: CPT

## 2023-02-24 RX ORDER — HYDROCODONE BITARTRATE AND ACETAMINOPHEN 10; 325 MG/1; MG/1
1 TABLET ORAL EVERY 4 HOURS PRN
Qty: 60 TABLET | Refills: 0 | Status: SHIPPED | OUTPATIENT
Start: 2023-02-24

## 2023-02-24 RX ORDER — PREGABALIN 75 MG/1
75 CAPSULE ORAL 2 TIMES DAILY PRN
Qty: 60 CAPSULE | Refills: 0 | Status: SHIPPED | OUTPATIENT
Start: 2023-02-24 | End: 2023-08-25

## 2023-02-24 RX ADMIN — MORPHINE SULFATE 4 MG: 4 INJECTION, SOLUTION INTRAMUSCULAR; INTRAVENOUS at 08:02

## 2023-02-24 RX ADMIN — FAMOTIDINE 20 MG: 20 TABLET ORAL at 09:02

## 2023-02-24 RX ADMIN — HYDROCODONE BITARTRATE AND ACETAMINOPHEN 1 TABLET: 10; 325 TABLET ORAL at 09:02

## 2023-02-24 RX ADMIN — HYDROCODONE BITARTRATE AND ACETAMINOPHEN 1 TABLET: 10; 325 TABLET ORAL at 01:02

## 2023-02-24 RX ADMIN — POLYETHYLENE GLYCOL 3350 17 G: 17 POWDER, FOR SOLUTION ORAL at 09:02

## 2023-02-24 RX ADMIN — MORPHINE SULFATE 4 MG: 4 INJECTION, SOLUTION INTRAMUSCULAR; INTRAVENOUS at 05:02

## 2023-02-24 RX ADMIN — APIXABAN 5 MG: 2.5 TABLET, FILM COATED ORAL at 09:02

## 2023-02-24 RX ADMIN — ASPIRIN 81 MG: 81 TABLET, COATED ORAL at 09:02

## 2023-02-24 RX ADMIN — SOTALOL HYDROCHLORIDE 120 MG: 80 TABLET ORAL at 09:02

## 2023-02-24 RX ADMIN — CEFAZOLIN SODIUM 2 G: 2 SOLUTION INTRAVENOUS at 12:02

## 2023-02-24 NOTE — NURSING
Pt working with PT. Walking in room with walker. C/O severe pain in R knee. Given IV morphine at pt's request.

## 2023-02-24 NOTE — PT/OT/SLP PROGRESS
"Physical Therapy Treatment    Patient Name:  Anastacio Michaels   MRN:  [id number]    Recommendations:     Discharge Recommendations: home health PT  Discharge Equipment Recommendations: walker, rolling  Barriers to discharge: None    Assessment:     Anastacio Michaels is a 72 y.o. male admitted with a medical diagnosis of Osteoarthritis of right knee.  He presents with the following impairments/functional limitations: weakness, impaired endurance, impaired self care skills, impaired functional mobility, gait instability, impaired balance, decreased lower extremity function, decreased coordination, decreased safety awareness, pain, decreased ROM, impaired coordination, edema, impaired skin, orthopedic precautions.    Patient tolerated PT treatment well today. Despite patient's R TKA pain, he was able to perform ambulation with stand by assistance with rolling walker. He also demonstrated contact guard assistance with rolling walker with 4" curb step. Recommendation to dc to home for  PT. PT will continue to follow and progress as tolerated.      Rehab Prognosis: Good; patient would benefit from acute skilled PT services to address these deficits and reach maximum level of function.    Recent Surgery: Procedure(s) (LRB):  ARTHROPLASTY, KNEE, TOTAL (Right) 1 Day Post-Op    Plan:     During this hospitalization, patient to be seen daily to address the identified rehab impairments via gait training, therapeutic activities, therapeutic exercises, neuromuscular re-education and progress toward the following goals:    Plan of Care Expires:  03/02/23    Subjective     Chief Complaint: R TKA pain; upper R thigh/groin area; did not sleep  Patient/Family Comments/goals: Patient wants to go home to sleep. Patient agreeable to PT treatment.  Pain/Comfort:  Pain Rating 1:  (moderate R TKA pain)  Location - Side 1: Right  Location 1: knee  Pain Addressed 1: Reposition, Distraction  Pain Rating Post-Intervention 1:  " "(moderate R TKA pain)      Objective:     Communicated with nurse prior to session.  Patient found up in chair with peripheral IV upon PT entry to room.     General Precautions: Standard, fall  Orthopedic Precautions: RLE weight bearing as tolerated  Braces: N/A  Respiratory Status: Room air     Functional Mobility:  Transfers:     Sit to Stand:  stand by assistance with rolling walker  Gait:   100ft x 1; 30ft x 1 - both with stand by assistance and rolling walker  Demonstrated step-through gait pattern with initial demonstration of R LE circumduction deviation in swing phase (d/t lack of knee flexion).  Patient had been c/o R upper thigh/groin pain upon standing and ambulating; cued patient to straighten out his R foot/leg when walking; patient then demonstrated improved step-through gait pattern without circumduction.  Maintained lateral weight shift to the left throughout ambulation d/t weight bearing avoidance  Step-through gait pattern with reduce step length and michelle for the remainder of his ambulation  Stairs:    Pt ascended/descended 4" curb step with Rolling Walker with no handrails with Contact Guard Assistance.       AM-PAC 6 CLICK MOBILITY  Turning over in bed (including adjusting bedclothes, sheets and blankets)?: 3  Sitting down on and standing up from a chair with arms (e.g., wheelchair, bedside commode, etc.): 3  Moving from lying on back to sitting on the side of the bed?: 3  Moving to and from a bed to a chair (including a wheelchair)?: 3  Need to walk in hospital room?: 3  Climbing 3-5 steps with a railing?: 3  Basic Mobility Total Score: 18       Treatment & Education:  Perform sit<>stand transfers  Cued patient on hand placement   Perform gait and stairs  Cued patient on proper foot placement during ambulation to correct gait deviation  Cued patient on proper foot placement when ascending/descending curb step (up with the good, down with the bad/surgical LE)  PT educated patient and spouse " "re:   Safety with OOB mobility  Use of RW  Orthopedic precautions  Gait deviations  Therapeutic exercises  Discharge disposition    Pt and spouse verbalized understanding      Patient left up in chair with call button in reach, nurse notified, and spouse present.    GOALS:   Multidisciplinary Problems       Physical Therapy Goals          Problem: Physical Therapy    Goal Priority Disciplines Outcome Goal Variances Interventions   Physical Therapy Goal     PT, PT/OT Ongoing, Progressing     Description: Goals to be met by: 3/2/2023    Patient will increase functional independence with mobility by performin. Sit<>stand with supervision with rolling walker.  2. Gait x 150 feet with supervision with rolling walker.  3. Ascend/descend 4" curb step(s) with rolling walker and supervision.                            Time Tracking:     PT Received On: 23  PT Start Time: 852     PT Stop Time: 917  PT Total Time (min): 25 min     Billable Minutes: Gait Training 15 and Therapeutic Activity 10    Treatment Type: Treatment  PT/PTA: PT     PTA Visit Number: 0     2023  "

## 2023-02-24 NOTE — PROGRESS NOTES
"MED  Progress Note    Admit Date: 2/23/2023   LOS: 0 days     SUBJECTIVE:     Follow-up For:  Osteoarthritis of right knee    Interval History:     doing well post op.  Had some pain control issues but better currently.  VSS.  Discussed with pt/wife/PT at bedside.  No CP/SOB/Calf pain.     Review of Systems:  Constitutional: No fever or chills  Respiratory: No cough or shortness of breath  Cardiovascular: No chest pain or palpitations  Gastrointestinal: No nausea or vomiting  Neurological: No confusion or weakness    OBJECTIVE:     Vital Signs Range (Last 24H):  BP (!) 149/74 (Patient Position: Lying)   Pulse 86   Temp 97.3 °F (36.3 °C) (Oral)   Resp 16   Ht 5' 10" (1.778 m)   Wt 104.8 kg (231 lb)   SpO2 95%   BMI 33.15 kg/m²     Temp:  [97.3 °F (36.3 °C)-98.6 °F (37 °C)]   Pulse:  [67-88]   Resp:  [15-20]   BP: (111-149)/(59-79)   SpO2:  [92 %-99 %]     I & O (Last 24H):  Intake/Output Summary (Last 24 hours) at 2/24/2023 0902  Last data filed at 2/24/2023 0620  Gross per 24 hour   Intake 3831.48 ml   Output 1600 ml   Net 2231.48 ml       Physical Exam:  General appearance: Well developed, well nourished  Eyes:  Conjunctivae/corneas clear. PERRL.  Lungs: Normal respiratory effort,   clear to auscultation bilaterally   Heart: AF rate and rhythm, S1, S2 normal, no murmur, rub or kaylene.  Abdomen: Soft, non-tender non-distended; bowel sounds normal; no masses,  no organomegaly  Extremities: No cyanosis or clubbing. No edema.    Skin: Skin color, texture, turgor normal. No rashes or lesions  Neurologic: Normal strength and tone. No focal numbness or weakness   Right knee CDI    Laboratory Data:  Recent Labs   Lab 02/24/23  0449   WBC 5.69   RBC 4.12*   HGB 13.9*   HCT 41.6   *   *   MCH 33.7*   MCHC 33.4       BMP:   Recent Labs   Lab 02/24/23  0450   *      K 4.3      CO2 22*   BUN 20   CREATININE 1.0   CALCIUM 8.7     Lab Results   Component Value Date    CALCIUM 8.7 02/24/2023 "    PHOS 3.7 08/13/2012       Lab Results   Component Value Date    CALCIUM 8.7 02/24/2023    PHOS 3.7 08/13/2012       No results found for: URICACID    BNP  No results for input(s): BNP, BNPTRIAGEBLO in the last 168 hours.    Medications:  Medication list was reviewed and changes noted under Assessment/Plan.    Diagnostic Results:        ASSESSMENT/PLAN:     Right Knee:  per ortho/therapy teams.     CAD:  placed on tele w/ events noted.  Cleared by Dr. Nikki JIMENEZ.       DM:  substituted for lantus with levemir.  ADA/SSI.  Resume jardiance at [facility].  Resume home regimen at [facility].     Afib:  sotolol.  Resumed eliquis per ortho.     Lipids:  statin.     HTN with hypotension post op due to spinal:  S/p epi.  resume home BP meds at [facility].       DVT:  eliquis per ortho.      Ok to DC.

## 2023-02-24 NOTE — DISCHARGE SUMMARY
"[facility] ([address])  Discharge Summary      Admit Date: 2/23/2023    Discharge Date and Time: No discharge date for patient encounter.    Attending Physician: Joe Holguin MD     Reason for Admission:  Osteoarthritis right knee    Procedures Performed: Procedure(s) (LRB):  ARTHROPLASTY, KNEE, TOTAL (Right)    Hospital Course (synopsis of major diagnoses, care, treatment, and services provided during the course of the hospital stay): The above patient underwent the above procedure.  Post operative the patient received pain management and pt / ot. The patient progressed well and will be discharged--see orders.      Goals of Care Treatment Preferences:  Code Status: Full Code      Consults: nephrology    Significant Diagnostic Studies: Labs: All labs within the past 24 hours have been reviewed    Final Diagnoses:    Principal Problem: Osteoarthritis of right knee   Secondary Diagnoses:   Active Hospital Problems    Diagnosis  POA    *Osteoarthritis of right knee [M17.11]  Yes      Resolved Hospital Problems   No resolved problems to display.       Discharged Condition: good    [place] [facility] [place] Up/Patient Instructions:     Medications:  Reconciled Home Medications:      Medication List        START taking these medications      HYDROcodone-acetaminophen  mg per tablet  Commonly known as: NORCO  Take 1 tablet by mouth every 4 (four) hours as needed.     pregabalin 75 MG capsule  Commonly known as: LYRICA  Take 1 capsule (75 mg total) by mouth 2 (two) times daily as needed (Nerve Pain).            CONTINUE taking these medications      apixaban 5 mg Tab  Commonly known as: ELIQUIS  Take 5 mg by mouth 2 (two) times daily.     aspirin 81 MG EC tablet  Commonly known as: ECOTRIN  Take 81 mg by mouth once daily.     atorvastatin 80 MG tablet  Commonly known as: LIPITOR  every evening.     BD ULTRA-FINE SHORT PEN NEEDLE 31 gauge x 5/16" Ndle  Generic drug: pen needle, " "diabetic     CENTRUM SILVER ORAL  Take by mouth Daily.     FLUVIRIN [id number] 45 mcg (15 mcg x 3)/0.5 mL Susp  Generic drug: flu vaccine ts 2015-16(4yr,up)     HEALTHY EYES ORAL  Take by mouth.     JARDIANCE 25 mg tablet  Generic drug: empagliflozin  once daily.     LANTUS SOLOSTAR U-100 INSULIN glargine 100 units/mL SubQ pen  Generic drug: insulin  2 (two) times a day. 30 units in the morning; 25 units in the evening     lisinopriL 20 MG tablet  Commonly known as: PRINIVIL,ZESTRIL  once daily.     metFORMIN 750 MG ER 24hr tablet  Commonly known as: GLUCOPHAGE-XR     sotaloL 120 MG Tab  Commonly known as: BETAPACE  2 (two) times daily.            Discharge Procedure Orders   WALKER FOR HOME USE     Order Specific Question Answer Comments   Type of Walker: Adult (5'4"-6'6")    With wheels? Yes    Height: 5' 10" (1.778 m)    Weight: 104.8 kg (231 lb)    Length of need (1-99 months): 99    Please check all that apply: Patient is unable to safely ambulate without equipment.      Leave dressing on - Keep it clean, dry, and intact until clinic visit      Follow-up Information       Joe Holguin MD Follow up.    Specialty: Orthopedic Surgery  Why: Call [phone] to reach Dr. Holguin, AS SCHEDULED   Contact information:  [address] [place] [id number]  [phone]                             "

## 2023-02-24 NOTE — PLAN OF CARE
Problem: Occupational Therapy  Goal: Occupational Therapy Goal  Description: Goals to be met by: 3/3/23    Patient will increase functional independence with ADLs by performing:    UE Dressing with Set-up Assistance.  LE Dressing with Set-up Assistance.  Grooming while standing at sink with [place].  Toileting from toilet with [place] for hygiene and clothing management.   Toilet transfer to toilet with Modified [place].    Outcome: Ongoing, Progressing     OT evaluation complete. SBA for functional ambulation and toilet transfer with RW. Supervision for grooming at the sink. Min A for dressing. Would benefit from acute OT while in house, then dc home with HH OT/PT.    DC: HH OT/PT  DME: [facility]

## 2023-02-24 NOTE — PLAN OF CARE
Problem: Adult Inpatient Plan of Care  Goal: Plan of Care Review  Outcome: Ongoing, Progressing  Goal: Optimal Comfort and Wellbeing  Outcome: Ongoing, Progressing  Goal: Readiness for Transition of Care  Outcome: Ongoing, Progressing     Problem: Diabetes Comorbidity  Goal: Blood Glucose Level Within Targeted Range  Outcome: Ongoing, Progressing     Problem: Infection  Goal: Absence of Infection Signs and Symptoms  Outcome: Ongoing, Progressing     Problem: Fall Injury Risk  Goal: Absence of Fall and Fall-Related Injury  Outcome: Ongoing, Progressing     Problem: Adjustment to Surgery (Knee Arthroplasty)  Goal: Optimal Coping  Outcome: Ongoing, Progressing     Problem: Bleeding (Knee Arthroplasty)  Goal: Absence of Bleeding  Outcome: Ongoing, Progressing     Problem: Bowel Motility Impaired (Knee Arthroplasty)  Goal: Effective Bowel Elimination  Outcome: Ongoing, Progressing     Problem: Functional Ability Impaired (Knee Arthroplasty)  Goal: Optimal Functional Ability  Outcome: Ongoing, Progressing     Problem: Infection (Knee Arthroplasty)  Goal: Absence of Infection Signs and Symptoms  Outcome: Ongoing, Progressing     Problem: Pain (Knee Arthroplasty)  Goal: Acceptable Pain Control  Outcome: Ongoing, Progressing

## 2023-02-24 NOTE — PT/OT/SLP EVAL
Occupational Therapy   Evaluation    Name: Anastacio Michaels  MRN: [id number]  Admitting Diagnosis: Osteoarthritis of right knee  Recent Surgery: Procedure(s) (LRB):  ARTHROPLASTY, KNEE, TOTAL (Right) 1 Day Post-Op    Recommendations:     Discharge Recommendations: home health OT, home health PT  Discharge Equipment Recommendations:  walker, rolling  Barriers to discharge:  None    Assessment:     Anastacio Michaels is a 72 y.o. male with a medical diagnosis of Osteoarthritis of right knee.  He presents with weakness, impaired endurance, impaired self care skills, impaired functional mobility, gait instability, impaired balance, decreased lower extremity function, pain, impaired cardiopulmonary response to activity.      Rehab Prognosis: Good; patient would benefit from acute skilled OT services to address these deficits and reach maximum level of function.       Plan:     Patient to be seen 5 x/week to address the above listed problems via self-care/home management, therapeutic activities, therapeutic exercises  Plan of Care Expires: 03/03/23  Plan of Care Reviewed with: patient, spouse    Subjective     Chief Complaint: R knee pain  Patient/Family Comments/goals: to return to [facility]    Occupational Profile:  Living Environment: [facility], 1 [address], walk in shower with shower chair, lives with wife.   Previous level of function: Driving, independent  Equipment Used at Home: other (see comments) (has reacher, long handled sock aid, quad cane, BSC, shower chair, and crutches. Has RW from wife's knee surgery but it is petite sized and not safe/appropriate for patient to use.)  Assistance upon Discharge: wife    Pain/Comfort:  Pain Rating 1: other (see comments) (unrated pain to R knee)  Pain Addressed 1: Reposition, Distraction, Cessation of Activity, Nurse notified    Patients cultural, spiritual, Christianity conflicts given the current situation: no    Objective:     Communicated with: RN prior to session.  Patient found  up in chair with peripheral IV, cryotherapy, SCD upon OT entry to room.    General Precautions: Standard, fall  Orthopedic Precautions: RLE weight bearing as tolerated  Braces: N/A  Respiratory Status: Room air    Occupational Performance:    Functional Mobility/Transfers:  Sit <> Stand: SBA with RW  Toilet: SBA with RW and BSC over toilet. Pt and wife educated on environmental set up and verbalized understanding.   Functional Mobility: Pt required SBA for functional ambulation with RW to/from bathroom, approx 20 feet total. Limited by R knee pain.     Activities of Daily Living:  Grooming: Supervision in standing at sink for hand hygiene  Toileting: Supervision in standing to urinate at toilet.   LBD: Min A. Pt and wife educated on use of adaptive equipment and dressing techniques. Pt and wife verbalized understanding.   UBD: Set up assist in sitting     Cognitive/Visual Perceptual:  Cognitive/Psychosocial Skills:     -       Oriented to: not formally assessed, no deficits noted   -       Follows Commands/attention:Follows multistep  commands  -       Communication: clear/fluent  -       Memory: No Deficits noted  -       Safety awareness/insight to disability: intact   -       Mood/Affect/Coping skills/emotional control: Pleasant    Physical Exam:  BUE ROM WFL, pt with hx of bilateral shoulder surgeries and ROM limited at baseline   BUE MMT observed to be WFL during functional mobility and ADLs.    AMPAC 6 Click ADL:  AMPAC Total Score: 19    Treatment & Education:  OT role, plan of care, progression of goals, importance of continued OOB activity, ADL/functional transfer and mobility retraining, discharge recommendation, call don't fall, safety precautions, fall prevention, weight bearing.    Patient left up in chair with all lines intact, call button in reach, RN notified, and wife present    GOALS:   Multidisciplinary Problems       Occupational Therapy Goals          Problem: Occupational Therapy    Goal  Priority Disciplines Outcome Interventions   Occupational Therapy Goal     OT, PT/OT Ongoing, Progressing    Description: Goals to be met by: 3/3/23    Patient will increase functional independence with ADLs by performing:    UE Dressing with Set-up Assistance.  LE Dressing with Set-up Assistance.  Grooming while standing at sink with [place].  Toileting from toilet with [place] for hygiene and clothing management.   Toilet transfer to toilet with Modified [place].                         History:     Past Medical History:   Diagnosis Date    Anticoagulant long-term use     Arthritis     Atrial fibrillation     Coronary artery disease     Diabetes mellitus     Hypertension     PONV (postoperative nausea and vomiting)          Past Surgical History:   Procedure Laterality Date    ANKLE SURGERY Left 1986    ARTHROPLASTY OF SHOULDER Left     CARDIOVERSION      x 2    COLONOSCOPY      several    CORONARY ANGIOPLASTY WITH STENT PLACEMENT      x2    HERNIA REPAIR      KNEE ARTHROSCOPY Bilateral     REVERSE TOTAL SHOULDER ARTHROPLASTY Right 03/22/2022    Procedure: ARTHROPLASTY, SHOULDER, TOTAL, REVERSE - RIGHT;  Surgeon: Claude S. Williams IV, MD;  Location: [facility];  Service: Orthopedics;  Laterality: Right;    ROTATOR CUFF REPAIR Right     TOTAL KNEE ARTHROPLASTY Right 2/23/2023    Procedure: ARTHROPLASTY, KNEE, TOTAL;  Surgeon: Joe Holguin MD;  Location: [facility];  Service: Orthopedics;  Laterality: Right;       Time Tracking:     OT Date of Treatment: 02/24/23  OT Start Time: 0817  OT Stop Time: 0852  OT Total Time (min): 35 min    Billable Minutes:Evaluation 10 minutes  Self Care/Home Management 25 minutes    2/24/2023

## 2023-02-24 NOTE — PLAN OF CARE
Patient will discharge home with HH. Home Health agency will be [facility].  Patient family will provide transportation.  Patient will discharge with DME of RW.  All discharge needs have been address.                      02/24/23 1013   Final Note   Assessment Type Final Discharge Note   Anticipated Discharge Disposition [facility] Resources/Appts/Education Provided Provided patient/caregiver with written discharge plan information;Appointments scheduled and added to AVS;Appointments scheduled by Navigator/Coordinator   Post-Acute Status   Post-Acute Authorization Home Health   Home Health Status Set-up Complete/Auth obtained   Patient choice form signed by patient/caregiver List with quality metrics by geographic area provided   Discharge Delays None known at this time

## 2023-03-17 PROBLEM — M25.461 PAIN AND SWELLING OF RIGHT KNEE: Status: ACTIVE | Noted: 2023-03-17

## 2023-03-17 PROBLEM — Z96.651 PRESENCE OF RIGHT ARTIFICIAL KNEE JOINT: Status: ACTIVE | Noted: 2023-03-17

## 2023-03-17 PROBLEM — Z74.09 IMPAIRED FUNCTIONAL MOBILITY, BALANCE, GAIT, AND ENDURANCE: Status: ACTIVE | Noted: 2023-03-17

## 2023-03-17 PROBLEM — R29.898 WEAKNESS OF RIGHT LOWER EXTREMITY: Status: ACTIVE | Noted: 2023-03-17

## 2023-03-17 PROBLEM — M25.661 DECREASED RANGE OF MOTION OF RIGHT KNEE: Status: ACTIVE | Noted: 2023-03-17

## 2023-03-17 PROBLEM — M25.561 ACUTE PAIN OF RIGHT KNEE: Status: ACTIVE | Noted: 2023-03-17

## 2023-03-17 PROBLEM — Z96.651 S/P TOTAL KNEE ARTHROPLASTY, RIGHT: Status: ACTIVE | Noted: 2023-03-17

## 2023-03-17 PROBLEM — M25.561 PAIN AND SWELLING OF RIGHT KNEE: Status: ACTIVE | Noted: 2023-03-17

## (undated) DEVICE — NDL 18GA X1 1/2 REG BEVEL

## (undated) DEVICE — GLOVE BIOGEL SKINSENSE PI 8.5

## (undated) DEVICE — SUPPORT SLING SHOT II MEDIUM

## (undated) DEVICE — STAPLER SKIN ROTATING HEAD

## (undated) DEVICE — TIP YANKAUERS BULB NO VENT

## (undated) DEVICE — Device

## (undated) DEVICE — DRAPE STERI U-SHAPED 47X51IN

## (undated) DEVICE — BNDG COFLEX FOAM LF2 ST 6X5YD

## (undated) DEVICE — YANKAUER OPEN TIP W/O VENT

## (undated) DEVICE — STAPLER SKIN PROXIMATE WIDE

## (undated) DEVICE — BLANKET HYPER ADULT 24X60IN

## (undated) DEVICE — UNDERGLOVES BIOGEL PI SIZE 8

## (undated) DEVICE — ELECTRODE REM PLYHSV RETURN 9

## (undated) DEVICE — SUT VICRYL 2-0 8-18 CP-2

## (undated) DEVICE — COVER MAYO STAND REINFRCD 30

## (undated) DEVICE — COVER HD BACK TABLE 6FT

## (undated) DEVICE — KIT TOTAL HIP OPTIVAC

## (undated) DEVICE — PAD ABDOMINAL STERILE 8X10IN

## (undated) DEVICE — APPLICATOR CHLORAPREP ORN 26ML

## (undated) DEVICE — TOURNIQUET SB QC DP 34X4IN

## (undated) DEVICE — BNDG COFLEX FOAM LF2 ST 4X5YD

## (undated) DEVICE — DRAPE PLASTIC U 60X72

## (undated) DEVICE — COVER BACK TABLE 72X21

## (undated) DEVICE — BLADE SAG DUAL 18MMX1.27MMX90M

## (undated) DEVICE — DRESSING N ADH OIL EMUL 3X8

## (undated) DEVICE — PAD CAST SPECIALIST STRL 6

## (undated) DEVICE — SUT ETHIBOND EXCEL 2 V37 30

## (undated) DEVICE — GLOVE BIOGEL SKINSENSE PI 7.5

## (undated) DEVICE — BLADE SAW SAG 25.40MM X 1.27MM

## (undated) DEVICE — GLOVE BIOGEL SKINSENSE PI 7.0

## (undated) DEVICE — GLOVE BIOGEL SKINSENSE PI 6.5

## (undated) DEVICE — ALCOHOL ISOPROPYL BLU 70% 16OZ

## (undated) DEVICE — BLADE SURG STAINLESS STEEL #15

## (undated) DEVICE — SEE MEDLINE ITEM 157131

## (undated) DEVICE — DRAPE INCISE IOBAN 2 23X17IN

## (undated) DEVICE — DRAPE EXTREMITY ORTHOMAX

## (undated) DEVICE — NDL SAFETY 22G X 1.5 ECLIPSE

## (undated) DEVICE — SYR 50ML CATH TIP

## (undated) DEVICE — DRAPE SURG W/TWL 17 5/8X23

## (undated) DEVICE — BANDAGE MATRIX HK LOOP 6IN 5YD

## (undated) DEVICE — PAD SHOULDER POLAR CARE XL

## (undated) DEVICE — SOL NACL IRR 1000ML BTL

## (undated) DEVICE — PULSAVAC ZIMMER

## (undated) DEVICE — PRESSURIZER BN CEMENT NOZZLE

## (undated) DEVICE — SUT VICRYL+ 1 CTX 18IN VIOL

## (undated) DEVICE — GOWN POLY REINF X-LONG XL

## (undated) DEVICE — SPONGE COTTON TRAY 4X4IN

## (undated) DEVICE — TRAY CATH FOL SIL URIMTR 16FR

## (undated) DEVICE — UNDERGLOVE BIOGEL PI SZ 6.5 LF

## (undated) DEVICE — STOCKINETTE TUBULAR 2PL 6 X 4

## (undated) DEVICE — SOL IRR SOD CHL .9% POUR

## (undated) DEVICE — BANDAGE ESMARK ELASTIC ST 6X9

## (undated) DEVICE — POSITIONER IV ARMBOARD FOAM

## (undated) DEVICE — MASK FLYTE HOOD PEEL AWAY

## (undated) DEVICE — SPONGE LAP 18X18 PREWASHED

## (undated) DEVICE — SCREW BONE RSP 5X38 GLENOID: Type: IMPLANTABLE DEVICE | Site: SHOULDER | Status: NON-FUNCTIONAL

## (undated) DEVICE — NDL MAYO CAT 1/2 CIR #6